# Patient Record
Sex: FEMALE | Race: WHITE | NOT HISPANIC OR LATINO | Employment: OTHER | URBAN - METROPOLITAN AREA
[De-identification: names, ages, dates, MRNs, and addresses within clinical notes are randomized per-mention and may not be internally consistent; named-entity substitution may affect disease eponyms.]

---

## 2019-04-04 ENCOUNTER — OFFICE VISIT (OUTPATIENT)
Dept: SURGERY | Facility: CLINIC | Age: 69
End: 2019-04-04
Payer: MEDICARE

## 2019-04-04 ENCOUNTER — HOSPITAL ENCOUNTER (OUTPATIENT)
Dept: PULMONOLOGY | Facility: HOSPITAL | Age: 69
Discharge: HOME | End: 2019-04-04
Attending: COLON & RECTAL SURGERY
Payer: MEDICARE

## 2019-04-04 VITALS
SYSTOLIC BLOOD PRESSURE: 126 MMHG | HEIGHT: 68 IN | RESPIRATION RATE: 16 BRPM | HEART RATE: 64 BPM | WEIGHT: 202.81 LBS | BODY MASS INDEX: 30.74 KG/M2 | DIASTOLIC BLOOD PRESSURE: 68 MMHG

## 2019-04-04 DIAGNOSIS — K64.4 EXTERNAL HEMORRHOIDS: ICD-10-CM

## 2019-04-04 DIAGNOSIS — K64.4 EXTERNAL HEMORRHOIDS: Primary | ICD-10-CM

## 2019-04-04 PROCEDURE — 46600 PR DIAG2STIC A2SCOPY: ICD-10-PCS | Mod: S$GLB,,, | Performed by: COLON & RECTAL SURGERY

## 2019-04-04 PROCEDURE — 46600 DIAGNOSTIC ANOSCOPY SPX: CPT | Mod: S$GLB,,, | Performed by: COLON & RECTAL SURGERY

## 2019-04-04 PROCEDURE — 99203 OFFICE O/P NEW LOW 30 MIN: CPT | Mod: 25,S$GLB,, | Performed by: COLON & RECTAL SURGERY

## 2019-04-04 PROCEDURE — 1101F PT FALLS ASSESS-DOCD LE1/YR: CPT | Mod: CPTII,S$GLB,, | Performed by: COLON & RECTAL SURGERY

## 2019-04-04 PROCEDURE — 99999 PR PBB SHADOW E&M-EST. PATIENT-LVL V: ICD-10-PCS | Mod: PBBFAC,,, | Performed by: COLON & RECTAL SURGERY

## 2019-04-04 PROCEDURE — 99203 PR OFFICE/OUTPT VISIT, NEW, LEVL III, 30-44 MIN: ICD-10-PCS | Mod: 25,S$GLB,, | Performed by: COLON & RECTAL SURGERY

## 2019-04-04 PROCEDURE — 93005 ELECTROCARDIOGRAM TRACING: CPT

## 2019-04-04 PROCEDURE — 1101F PR PT FALLS ASSESS DOC 0-1 FALLS W/OUT INJ PAST YR: ICD-10-PCS | Mod: CPTII,S$GLB,, | Performed by: COLON & RECTAL SURGERY

## 2019-04-04 PROCEDURE — 93010 EKG 12-LEAD: ICD-10-PCS | Mod: ,,, | Performed by: INTERNAL MEDICINE

## 2019-04-04 PROCEDURE — 93010 ELECTROCARDIOGRAM REPORT: CPT | Mod: ,,, | Performed by: INTERNAL MEDICINE

## 2019-04-04 PROCEDURE — 99999 PR PBB SHADOW E&M-EST. PATIENT-LVL V: CPT | Mod: PBBFAC,,, | Performed by: COLON & RECTAL SURGERY

## 2019-04-04 RX ORDER — ATORVASTATIN CALCIUM 20 MG/1
20 TABLET, FILM COATED ORAL DAILY
COMMUNITY
End: 2019-12-18 | Stop reason: SDUPTHER

## 2019-04-04 RX ORDER — ASPIRIN 81 MG/1
81 TABLET ORAL DAILY
COMMUNITY
End: 2020-08-14

## 2019-04-04 RX ORDER — TIZANIDINE 4 MG/1
4 TABLET ORAL NIGHTLY PRN
COMMUNITY
End: 2019-08-14

## 2019-04-04 RX ORDER — DEXLANSOPRAZOLE 30 MG/1
30 CAPSULE, DELAYED RELEASE ORAL DAILY
COMMUNITY
End: 2019-12-23 | Stop reason: SDUPTHER

## 2019-04-04 RX ORDER — CHLORTHALIDONE 25 MG/1
25 TABLET ORAL DAILY
COMMUNITY
End: 2019-12-18 | Stop reason: SDUPTHER

## 2019-04-04 RX ORDER — DULOXETIN HYDROCHLORIDE 60 MG/1
60 CAPSULE, DELAYED RELEASE ORAL DAILY
COMMUNITY
End: 2019-08-14 | Stop reason: ALTCHOICE

## 2019-04-04 NOTE — PROGRESS NOTES
Subjective:       Patient ID: Serina Dumas is a 68 y.o. female.    Chief Complaint: Hemorrhoids    HPI 67 yo F here with complaints of hemorrhoids.  She takes Linzess for chronic constipation, which worsened after back surgery in 2017..  She has a BM every other day but feels like she never completely evacuates.  She c/o pain and discomfort with BM's.  She also reports intermittent BRB on toilet paper when she wipes after BM's.    Last colonoscopy 2018 - polyp removed, internal hemorrhoids.    No family hx of CRC or IBD.  Sister  of breast cancer, father  of lung cancer.       Review of patient's allergies indicates:   Allergen Reactions    Adhesive Itching    Cleocin [clindamycin hcl]     Demerol [meperidine]     Keflex [cephalexin]        Past Medical History:   Diagnosis Date    Thyroid disease        Past Surgical History:   Procedure Laterality Date    BACK SURGERY      HYSTERECTOMY      TONSILLECTOMY      TYMPANOSTOMY TUBE PLACEMENT         Current Outpatient Medications   Medication Sig Dispense Refill    aspirin (ECOTRIN) 81 MG EC tablet Take 81 mg by mouth once daily.      atorvastatin (LIPITOR) 20 MG tablet Take 20 mg by mouth once daily.      chlorthalidone (HYGROTEN) 25 MG Tab Take 25 mg by mouth once daily.      dexlansoprazole (DEXILANT) 30 mg CpDM Take 30 mg by mouth once daily.      DULoxetine (CYMBALTA) 60 MG capsule Take 60 mg by mouth once daily.      levothyroxine (SYNTHROID) 137 MCG Tab tablet Take by mouth before breakfast.      linaclotide (LINZESS) 145 mcg Cap capsule Take 145 mcg by mouth once daily.      tiZANidine (ZANAFLEX) 4 MG tablet Take 4 mg by mouth nightly as needed.      vit A/vit C/vit E/zinc/copper (ICAPS AREDS ORAL) Take by mouth.      alprazolam (XANAX) 0.25 MG tablet Take 0.25 mg by mouth 3 (three) times daily.      apixaban 5 mg Tab Take by mouth 2 (two) times daily.      fluoxetine (PROZAC) 20 MG capsule Take 20 mg by mouth once daily.       hydrocodone-acetaminophen 5-325mg (NORCO) 5-325 mg per tablet Take 1 tablet by mouth every 6 (six) hours as needed for Pain. 30 tablet 0    ranitidine (ZANTAC) 150 MG tablet Take 150 mg by mouth 2 (two) times daily.      tramadol (ULTRAM) 50 mg tablet Take 50 mg by mouth every 6 (six) hours as needed for Pain.       No current facility-administered medications for this visit.        Family History   Problem Relation Age of Onset    Diabetes Mother     Cancer Father        Social History     Socioeconomic History    Marital status: Single     Spouse name: Not on file    Number of children: Not on file    Years of education: Not on file    Highest education level: Not on file   Occupational History    Not on file   Social Needs    Financial resource strain: Not on file    Food insecurity:     Worry: Not on file     Inability: Not on file    Transportation needs:     Medical: Not on file     Non-medical: Not on file   Tobacco Use    Smoking status: Light Tobacco Smoker   Substance and Sexual Activity    Alcohol use: No    Drug use: No    Sexual activity: Not on file   Lifestyle    Physical activity:     Days per week: Not on file     Minutes per session: Not on file    Stress: Not on file   Relationships    Social connections:     Talks on phone: Not on file     Gets together: Not on file     Attends Jain service: Not on file     Active member of club or organization: Not on file     Attends meetings of clubs or organizations: Not on file     Relationship status: Not on file   Other Topics Concern    Not on file   Social History Narrative    Not on file       Review of Systems   Constitutional: Negative for chills and fever.   HENT: Negative for congestion and sore throat.    Eyes: Negative for visual disturbance.   Respiratory: Negative for cough and shortness of breath.    Cardiovascular: Negative for chest pain and palpitations.   Gastrointestinal: Positive for anal bleeding,  constipation and rectal pain. Negative for abdominal distention, abdominal pain, blood in stool, diarrhea, nausea and vomiting.   Endocrine: Negative for cold intolerance and heat intolerance.   Genitourinary: Negative for dysuria and frequency.   Musculoskeletal: Positive for back pain and neck pain. Negative for arthralgias.   Skin: Negative for rash.   Allergic/Immunologic: Negative for immunocompromised state.   Neurological: Negative for dizziness, light-headedness and headaches.   Hematological: Does not bruise/bleed easily.   Psychiatric/Behavioral: Negative for confusion. The patient is not nervous/anxious.        Objective:      Physical Exam   Constitutional: She is oriented to person, place, and time. She appears well-developed and well-nourished.   HENT:   Head: Normocephalic.   Pulmonary/Chest: Effort normal. No respiratory distress.   Abdominal: Soft. Bowel sounds are normal. She exhibits no distension and no mass. There is no tenderness. There is no rebound and no guarding.   Genitourinary:   Genitourinary Comments: Perineum - normal perianal skin, no mass, no fissure, large inflamed anterior midline external hemorrhoid  DOMINGO - good tone, no mass  Anoscopy - Grade 1 internal hemorrhoids without significant inflammation     Musculoskeletal: Normal range of motion.   Neurological: She is alert and oriented to person, place, and time.   Skin: Skin is warm and dry.   Psychiatric: She has a normal mood and affect.           Assessment:       1. External hemorrhoids        Plan:   We discussed aggressive conservative measures versus excisional hemorrhoidectomy, and she would like to proceed with surgery. We discussed the expected degree and duration of postoperative discomfort, and she voiced her understanding of this.  We also discussed the importance of avoiding constipation in the immediate postoperative period as much as possible.    She is scheduled for excisional [facility]    I have discussed the procedure at length with Serina Dumas.  We discussed the rationale, risks, benefits, and alternatives in depth.  We discussed the expected outcomes and potential complications including but not limited to Delayed/prolonged wound healing, bleeding, infection, recurrence, prolonged pain, need for further procedures and altered continence.  She verbalized her understanding of the procedure and wishes to proceed.  Written consent was obtained.      Nicho Meza MD, FACS, FASCRS  Senior Staff Surgeon  Department of Colon & Rectal Surgery

## 2019-04-04 NOTE — H&P (VIEW-ONLY)
Subjective:       Patient ID: Serina Dumas is a 68 y.o. female.    Chief Complaint: Hemorrhoids    HPI 69 yo F here with complaints of hemorrhoids.  She takes Linzess for chronic constipation, which worsened after back surgery in 2017..  She has a BM every other day but feels like she never completely evacuates.  She c/o pain and discomfort with BM's.  She also reports intermittent BRB on toilet paper when she wipes after BM's.    Last colonoscopy 2018 - polyp removed, internal hemorrhoids.    No family hx of CRC or IBD.  Sister  of breast cancer, father  of lung cancer.       Review of patient's allergies indicates:   Allergen Reactions    Adhesive Itching    Cleocin [clindamycin hcl]     Demerol [meperidine]     Keflex [cephalexin]        Past Medical History:   Diagnosis Date    Thyroid disease        Past Surgical History:   Procedure Laterality Date    BACK SURGERY      HYSTERECTOMY      TONSILLECTOMY      TYMPANOSTOMY TUBE PLACEMENT         Current Outpatient Medications   Medication Sig Dispense Refill    aspirin (ECOTRIN) 81 MG EC tablet Take 81 mg by mouth once daily.      atorvastatin (LIPITOR) 20 MG tablet Take 20 mg by mouth once daily.      chlorthalidone (HYGROTEN) 25 MG Tab Take 25 mg by mouth once daily.      dexlansoprazole (DEXILANT) 30 mg CpDM Take 30 mg by mouth once daily.      DULoxetine (CYMBALTA) 60 MG capsule Take 60 mg by mouth once daily.      levothyroxine (SYNTHROID) 137 MCG Tab tablet Take by mouth before breakfast.      linaclotide (LINZESS) 145 mcg Cap capsule Take 145 mcg by mouth once daily.      tiZANidine (ZANAFLEX) 4 MG tablet Take 4 mg by mouth nightly as needed.      vit A/vit C/vit E/zinc/copper (ICAPS AREDS ORAL) Take by mouth.      alprazolam (XANAX) 0.25 MG tablet Take 0.25 mg by mouth 3 (three) times daily.      apixaban 5 mg Tab Take by mouth 2 (two) times daily.      fluoxetine (PROZAC) 20 MG capsule Take 20 mg by mouth once daily.       hydrocodone-acetaminophen 5-325mg (NORCO) 5-325 mg per tablet Take 1 tablet by mouth every 6 (six) hours as needed for Pain. 30 tablet 0    ranitidine (ZANTAC) 150 MG tablet Take 150 mg by mouth 2 (two) times daily.      tramadol (ULTRAM) 50 mg tablet Take 50 mg by mouth every 6 (six) hours as needed for Pain.       No current facility-administered medications for this visit.        Family History   Problem Relation Age of Onset    Diabetes Mother     Cancer Father        Social History     Socioeconomic History    Marital status: Single     Spouse name: Not on file    Number of children: Not on file    Years of education: Not on file    Highest education level: Not on file   Occupational History    Not on file   Social Needs    Financial resource strain: Not on file    Food insecurity:     Worry: Not on file     Inability: Not on file    Transportation needs:     Medical: Not on file     Non-medical: Not on file   Tobacco Use    Smoking status: Light Tobacco Smoker   Substance and Sexual Activity    Alcohol use: No    Drug use: No    Sexual activity: Not on file   Lifestyle    Physical activity:     Days per week: Not on file     Minutes per session: Not on file    Stress: Not on file   Relationships    Social connections:     Talks on phone: Not on file     Gets together: Not on file     Attends [employer] service: Not on file     Active member of club or organization: Not on file     Attends meetings of clubs or organizations: Not on file     Relationship status: Not on file   Other Topics Concern    Not on file   Social History Narrative    Not on file       Review of Systems   Constitutional: Negative for chills and fever.   HENT: Negative for congestion and sore throat.    Eyes: Negative for visual disturbance.   Respiratory: Negative for cough and shortness of breath.    Cardiovascular: Negative for chest pain and palpitations.   Gastrointestinal: Positive for anal bleeding,  constipation and rectal pain. Negative for abdominal distention, abdominal pain, blood in stool, diarrhea, nausea and vomiting.   Endocrine: Negative for cold intolerance and heat intolerance.   Genitourinary: Negative for dysuria and frequency.   Musculoskeletal: Positive for back pain and neck pain. Negative for arthralgias.   Skin: Negative for rash.   Allergic/Immunologic: Negative for immunocompromised state.   Neurological: Negative for dizziness, light-headedness and headaches.   Hematological: Does not bruise/bleed easily.   Psychiatric/Behavioral: Negative for confusion. The patient is not nervous/anxious.        Objective:      Physical Exam   Constitutional: She is oriented to person, place, and time. She appears well-developed and well-nourished.   HENT:   Head: Normocephalic.   Pulmonary/Chest: Effort normal. No respiratory distress.   Abdominal: Soft. Bowel sounds are normal. She exhibits no distension and no mass. There is no tenderness. There is no rebound and no guarding.   Genitourinary:   Genitourinary Comments: Perineum - normal perianal skin, no mass, no fissure, large inflamed anterior midline external hemorrhoid  DOMINGO - good tone, no mass  Anoscopy - Grade 1 internal hemorrhoids without significant inflammation     Musculoskeletal: Normal range of motion.   Neurological: She is alert and oriented to person, place, and time.   Skin: Skin is warm and dry.   Psychiatric: She has a normal mood and affect.           Assessment:       1. External hemorrhoids        Plan:   We discussed aggressive conservative measures versus excisional hemorrhoidectomy, and she would like to proceed with surgery. We discussed the expected degree and duration of postoperative discomfort, and she voiced her understanding of this.  We also discussed the importance of avoiding constipation in the immediate postoperative period as much as possible.    She is scheduled for excisional [facility]    I have discussed the procedure at length with Serina Dumas.  We discussed the rationale, risks, benefits, and alternatives in depth.  We discussed the expected outcomes and potential complications including but not limited to Delayed/prolonged wound healing, bleeding, infection, recurrence, prolonged pain, need for further procedures and altered continence.  She verbalized her understanding of the procedure and wishes to proceed.  Written consent was obtained.      Nicho Meza MD, FACS, FASCRS  Senior Staff Surgeon  Department of Colon & Rectal Surgery

## 2019-04-04 NOTE — LETTER
April 4, 2019      Nitin Ray MD  [address]  [address]  [facility] [address]           [facility]  [address] [place] [id number]  Phone: [phone]  Fax: [phone]          Patient: Serina Dumas   MR Number: [id number]   YOB: 1950   Date of Visit: 4/4/2019       Dear Dr. Nitin Ray:    Thank you for referring Serina Dumas to me for evaluation. Attached you will find relevant portions of my assessment and plan of care.    If you have questions, please do not hesitate to call me. I look forward to following Serina Dumas along with you.    Sincerely,    Nicho Meza MD    Enclosure  CC:  Alyssa Turner MD    If you would like to receive this communication electronically, please contact [email or web address] or [phone] to request more information on [employer] Link access.    For providers and/or their staff who would like to refer a patient to [facility], please contact us through our one-stop-shop provider referral line, [facility], at [phone].    If you feel you have received this communication in error or would no longer like to receive these types of communications, please e-mail [email or web address]

## 2019-04-25 ENCOUNTER — HOSPITAL ENCOUNTER (OUTPATIENT)
Facility: HOSPITAL | Age: 69
Discharge: HOME | End: 2019-04-25
Attending: COLON & RECTAL SURGERY | Admitting: COLON & RECTAL SURGERY
Payer: MEDICARE

## 2019-04-25 ENCOUNTER — ANESTHESIA (OUTPATIENT)
Dept: SURGERY | Facility: HOSPITAL | Age: 69
End: 2019-04-25
Payer: MEDICARE

## 2019-04-25 ENCOUNTER — ANESTHESIA EVENT (OUTPATIENT)
Dept: SURGERY | Facility: HOSPITAL | Age: 69
End: 2019-04-25
Payer: MEDICARE

## 2019-04-25 ENCOUNTER — TELEPHONE (OUTPATIENT)
Dept: SURGERY | Facility: CLINIC | Age: 69
End: 2019-04-25

## 2019-04-25 VITALS
TEMPERATURE: 97 F | DIASTOLIC BLOOD PRESSURE: 69 MMHG | RESPIRATION RATE: 18 BRPM | SYSTOLIC BLOOD PRESSURE: 149 MMHG | HEART RATE: 72 BPM | OXYGEN SATURATION: 100 %

## 2019-04-25 DIAGNOSIS — K64.9 HEMORRHOIDS, UNSPECIFIED HEMORRHOID TYPE: Primary | ICD-10-CM

## 2019-04-25 DIAGNOSIS — K64.9 HEMORRHOIDS: ICD-10-CM

## 2019-04-25 PROCEDURE — 00902 ANES ANORECTAL PX: CPT | Performed by: COLON & RECTAL SURGERY

## 2019-04-25 PROCEDURE — 46255 PR HEMORRHOIDECTOMY,INT/EXT,1 COLUMN/GROUP: ICD-10-PCS | Mod: 52,,, | Performed by: COLON & RECTAL SURGERY

## 2019-04-25 PROCEDURE — 37000008 HC ANESTHESIA 1ST 15 MINUTES: Performed by: COLON & RECTAL SURGERY

## 2019-04-25 PROCEDURE — 63600175 PHARM REV CODE 636 W HCPCS: Performed by: NURSE ANESTHETIST, CERTIFIED REGISTERED

## 2019-04-25 PROCEDURE — 37000009 HC ANESTHESIA EA ADD 15 MINS: Performed by: COLON & RECTAL SURGERY

## 2019-04-25 PROCEDURE — 88304 TISSUE EXAM BY PATHOLOGIST: CPT | Mod: 26,,, | Performed by: PATHOLOGY

## 2019-04-25 PROCEDURE — 88304 TISSUE SPECIMEN TO PATHOLOGY - SURGERY: ICD-10-PCS | Mod: 26,,, | Performed by: PATHOLOGY

## 2019-04-25 PROCEDURE — 71000033 HC RECOVERY, INTIAL HOUR: Performed by: COLON & RECTAL SURGERY

## 2019-04-25 PROCEDURE — 25000003 PHARM REV CODE 250: Performed by: NURSE ANESTHETIST, CERTIFIED REGISTERED

## 2019-04-25 PROCEDURE — S0020 INJECTION, BUPIVICAINE HYDRO: HCPCS | Performed by: COLON & RECTAL SURGERY

## 2019-04-25 PROCEDURE — 36000706: Performed by: COLON & RECTAL SURGERY

## 2019-04-25 PROCEDURE — 00902 ANES ANORECTAL PX: CPT | Mod: QZ,P2 | Performed by: NURSE ANESTHETIST, CERTIFIED REGISTERED

## 2019-04-25 PROCEDURE — 88304 TISSUE EXAM BY PATHOLOGIST: CPT | Performed by: PATHOLOGY

## 2019-04-25 PROCEDURE — 46255 REMOVE INT/EXT HEM 1 GROUP: CPT | Mod: 52,,, | Performed by: COLON & RECTAL SURGERY

## 2019-04-25 PROCEDURE — 25000003 PHARM REV CODE 250: Performed by: COLON & RECTAL SURGERY

## 2019-04-25 PROCEDURE — 36000707: Performed by: COLON & RECTAL SURGERY

## 2019-04-25 RX ORDER — FENTANYL CITRATE 50 UG/ML
INJECTION, SOLUTION INTRAMUSCULAR; INTRAVENOUS
Status: DISCONTINUED | OUTPATIENT
Start: 2019-04-25 | End: 2019-04-25

## 2019-04-25 RX ORDER — MIDAZOLAM HYDROCHLORIDE 1 MG/ML
INJECTION, SOLUTION INTRAMUSCULAR; INTRAVENOUS
Status: DISCONTINUED | OUTPATIENT
Start: 2019-04-25 | End: 2019-04-25

## 2019-04-25 RX ORDER — PROPOFOL 10 MG/ML
INJECTION, EMULSION INTRAVENOUS
Status: DISCONTINUED | OUTPATIENT
Start: 2019-04-25 | End: 2019-04-25

## 2019-04-25 RX ORDER — LIDOCAINE HYDROCHLORIDE 10 MG/ML
INJECTION, SOLUTION EPIDURAL; INFILTRATION; INTRACAUDAL; PERINEURAL
Status: DISCONTINUED | OUTPATIENT
Start: 2019-04-25 | End: 2019-04-25 | Stop reason: HOSPADM

## 2019-04-25 RX ORDER — OXYCODONE AND ACETAMINOPHEN 10; 325 MG/1; MG/1
1 TABLET ORAL EVERY 4 HOURS PRN
Status: DISCONTINUED | OUTPATIENT
Start: 2019-04-25 | End: 2019-04-25 | Stop reason: HOSPADM

## 2019-04-25 RX ORDER — SODIUM CHLORIDE 9 MG/ML
INJECTION, SOLUTION INTRAVENOUS CONTINUOUS
Status: DISCONTINUED | OUTPATIENT
Start: 2019-04-25 | End: 2020-05-28

## 2019-04-25 RX ORDER — OXYCODONE AND ACETAMINOPHEN 5; 325 MG/1; MG/1
1 TABLET ORAL EVERY 6 HOURS PRN
Qty: 40 TABLET | Refills: 0 | Status: SHIPPED | OUTPATIENT
Start: 2019-04-25 | End: 2019-05-23

## 2019-04-25 RX ORDER — HYDROMORPHONE HYDROCHLORIDE 2 MG/ML
0.5 INJECTION, SOLUTION INTRAMUSCULAR; INTRAVENOUS; SUBCUTANEOUS
Status: DISCONTINUED | OUTPATIENT
Start: 2019-04-25 | End: 2019-04-25 | Stop reason: HOSPADM

## 2019-04-25 RX ORDER — MUPIROCIN 20 MG/G
OINTMENT TOPICAL
Status: ACTIVE | OUTPATIENT
Start: 2019-04-25

## 2019-04-25 RX ORDER — SODIUM CHLORIDE, SODIUM LACTATE, POTASSIUM CHLORIDE, CALCIUM CHLORIDE 600; 310; 30; 20 MG/100ML; MG/100ML; MG/100ML; MG/100ML
INJECTION, SOLUTION INTRAVENOUS CONTINUOUS PRN
Status: DISCONTINUED | OUTPATIENT
Start: 2019-04-25 | End: 2019-04-25

## 2019-04-25 RX ORDER — LIDOCAINE HCL/PF 100 MG/5ML
SYRINGE (ML) INTRAVENOUS
Status: DISCONTINUED | OUTPATIENT
Start: 2019-04-25 | End: 2019-04-25

## 2019-04-25 RX ORDER — BUPIVACAINE HYDROCHLORIDE 5 MG/ML
INJECTION, SOLUTION EPIDURAL; INTRACAUDAL
Status: DISCONTINUED | OUTPATIENT
Start: 2019-04-25 | End: 2019-04-25 | Stop reason: HOSPADM

## 2019-04-25 RX ADMIN — MIDAZOLAM 2 MG: 1 INJECTION INTRAMUSCULAR; INTRAVENOUS at 09:04

## 2019-04-25 RX ADMIN — PROPOFOL 100 MG: 10 INJECTION, EMULSION INTRAVENOUS at 09:04

## 2019-04-25 RX ADMIN — LIDOCAINE HYDROCHLORIDE 60 MG: 20 INJECTION, SOLUTION INTRAVENOUS at 09:04

## 2019-04-25 RX ADMIN — FENTANYL CITRATE 50 MCG: 50 INJECTION, SOLUTION INTRAMUSCULAR; INTRAVENOUS at 09:04

## 2019-04-25 RX ADMIN — SODIUM CHLORIDE, SODIUM LACTATE, POTASSIUM CHLORIDE, AND CALCIUM CHLORIDE: .6; .31; .03; .02 INJECTION, SOLUTION INTRAVENOUS at 09:04

## 2019-04-25 NOTE — TELEPHONE ENCOUNTER
----- Message from Desire Fontanez sent at 4/25/2019 11:31 AM CDT -----  Contact: pt#[phone]  Needs Advice    Reason for call:pt needs a two week post op in [facility]         Communication Preference:call    Additional Information:

## 2019-04-25 NOTE — BRIEF OP NOTE
[facility]  Brief Operative Note    SUMMARY     Surgery Date: 4/25/2019     Surgeon(s) and Role:     * Nicho Meza MD - Primary    Assisting Surgeon: None      Pre-op Diagnosis:  External hemorrhoids [K64.4]    Operative Care:  Post-op Diagnosis: Post-Op Diagnosis Codes:     * External hemorrhoids [K64.4]    Procedure(s) (LRB):  HEMORRHOIDECTOMY (N/A)    Anesthesia: Local/MAC  Total volume administered 300 cc     Technical Procedures Used: Excision large anterior midline external hemorrhod    Description of the findings of the procedure: Large anterior midline external hemorrhoid     Wound Class (Contaminated)    Complications: No     Estimated Blood Loss: * No values recorded between 4/25/2019  9:36 AM and 4/25/2019  9:49 AM * <5 cc          Specimens: Anterior midline external hemorrhoid    Implants: * No implants in log *    Post-Operative Care:         Disposition: PACU - hemodynamically stable.           Condition: Good  PT Temp >36 upon leaving the OR? Yes

## 2019-04-25 NOTE — ANESTHESIA PREPROCEDURE EVALUATION
04/25/2019  Serina Dumas is a 68 y.o., female.    Anesthesia Evaluation    I have reviewed the Patient Summary Reports.    I have reviewed the Nursing Notes.   I have reviewed the Medications.     Review of Systems  Anesthesia Hx:  No problems with previous Anesthesia    Social:  Non-Smoker, No Alcohol Use    Hematology/Oncology:  Hematology Normal   Oncology Normal     EENT/Dental:EENT/Dental Normal   Cardiovascular:  Cardiovascular Normal Exercise tolerance: good     Pulmonary:  Pulmonary Normal    Renal/:  Renal/ Normal     Hepatic/GI:  Hepatic/GI Normal    Musculoskeletal:  Musculoskeletal Normal    Neurological:  Neurology Normal    Endocrine:  Endocrine Normal    Dermatological:  Skin Normal    Psych:  Psychiatric Normal           Physical Exam  General:  Obesity    Airway/Jaw/Neck:  Airway Findings: Mouth Opening: Normal Tongue: Normal  General Airway Assessment: Adult  Mallampati: II  TM Distance: Normal, at least 6 cm  Jaw/Neck Findings:  Neck ROM: Normal ROM      Dental:  Dental Findings: In tact        Mental Status:  Mental Status Findings:  Cooperative         Anesthesia Plan  Type of Anesthesia, risks & benefits discussed:  Anesthesia Type:  general, MAC  Patient's Preference:   Intra-op Monitoring Plan: standard ASA monitors  Intra-op Monitoring Plan Comments:   Post Op Pain Control Plan: multimodal analgesia  Post Op Pain Control Plan Comments:   Induction:   IV  Beta Blocker:  Patient is not currently on a Beta-Blocker (No further documentation required).       Informed Consent: Patient understands risks and agrees with Anesthesia plan.  Questions answered. Anesthesia consent signed with patient.  ASA Score: 2     Day of Surgery Review of History & Physical: I have interviewed and examined the patient. I have reviewed the patient's H&P dated: 4/25/19. There are no significant changes.   H&P update referred to the surgeon.         Ready For Surgery From Anesthesia Perspective.

## 2019-04-25 NOTE — OP NOTE
DATE OF PROCEDURE:  04/25/2019    PREOPERATIVE DIAGNOSIS:  Symptomatic external hemorrhoids.    POSTOPERATIVE DIAGNOSIS:  Symptomatic external hemorrhoids.    PROCEDURE:  Excisional hemorrhoidectomy (single column, external only).    SURGEON:  Nicho Meza M.D.    ASSISTANT:  None.    ANESTHESIA:  Local MAC.    IV FLUIDS:  300 mL crystalloid.    BLOOD LOSS:  Less than 5 mL.    DRAINS:  None.    SPECIMENS:  Anterior midline external hemorrhoid to Pathology.    COMPLICATIONS:  None.    OPERATIVE FINDINGS:  Large inflamed anterior midline external hemorrhoid, no   significant internal hemorrhoidal disease.    INDICATIONS:  The patient is a 68-year-old female who presented to the office   with complaints of perianal pain, irritation and bleeding.  On examination, she   was found to have a large anterior midline external hemorrhoid as well as some   smaller internal hemorrhoidal disease.  We discussed conservative options in   terms of local symptomatic relief versus surgical excision of the external   hemorrhoid and she elected for the latter.  She presents today for an elective   hemorrhoidectomy.    DESCRIPTION OF PROCEDURE:  The patient was identified, brought to the Operating   Room and placed on the table in a prone position after obtaining informed   consent and after ensuring adequate padding of all pressure points.  After   initiation of monitored anesthesia care, the table was jackknifed and the   buttocks were taped apart to efface the anal verge.  The perianal region was   prepped and draped in usual sterile fashion.  An anal block was performed using   a combination of 1% lidocaine and 0.25% Marcaine.  On visual inspection of the   anal verge and anal margin, there was a large anterior midline external   hemorrhoid present.  Evaluation of the anal canal with a lighted Su   retractor revealed minimal internal hemorrhoidal disease, which did not require   surgical intervention.  I then turned my attention  towards exercising the   anterior midline external hemorrhoid.  An elliptical incision was made around   the hemorrhoidal tissue, was dissected off of the underlying sphincter   musculature making sure to ensure that there was no inadvertent injury to the   sphincter muscle.  Once the hemorrhoidal tissue was completely excised, this was   passed from the field and sent to Pathology.  Hemostasis was achieved with   electrocautery and the skin incision was closed with interrupted 3-0 chromic   sutures.  The anal canal was irrigated and noted to be hemostatic.  Gelfoam   gauze was placed within the anal canal and sterile dressings were applied.    The patient tolerated the procedure well with no complications.  She was   awakened from sedation in the Operating Room and taken to Recovery in   satisfactory condition.  All needle, instrument and sponge counts were correct   at the end of the case.  I was present throughout the entire procedure.      ABBY/IN  dd: 04/25/2019 11:03:39 (CDT)  td: 04/25/2019 11:17:21 (CDT)  Doc ID   #[id number]  Job ID #[id number]    CC:

## 2019-04-25 NOTE — DISCHARGE SUMMARY
Discharge Note      SUMMARY     Admit Date: 4/25/2019    Attending Physician: Nicho Meza MD     Discharge Physician: Nicho Meza MD    Discharge Date: 4/25/2019     Final Diagnosis: Post-Op Diagnosis Codes:     * External hemorrhoids [K64.4]    Hospital Course: Patient was admitted for an outpatient procedure and tolerated the procedure well with no complications.    Disposition: Home or Self Care    Follow Up/Patient Instructions:     High fiber diet/daily fiber supplement  8-10 glasses of water/day  Colace 100 mg 2x/day  Miralax 1 capful in glass of water 2x/day  Mineral oil 1 tbsp 2x/day for 1 week  Avoid straining/constipation  Gauze packing will pass with 1st BM  Dry dressing as needed  Some bleeding is expected - call for excessive bleeding  Motrin/Advil for milder pain  Percocet for more severe pain  Call for severe pain, fever >101, difficulty urinating, constipation  Follow-up with Dr. Meza in 3 weeks      Current Discharge Medication List      START taking these medications    Details   oxyCODONE-acetaminophen (PERCOCET) 5-325 mg per tablet Take 1 tablet by mouth every 6 (six) hours as needed for Pain.  Qty: 40 tablet, Refills: 0         CONTINUE these medications which have NOT CHANGED    Details   alprazolam (XANAX) 0.25 MG tablet Take 0.25 mg by mouth 3 (three) times daily.      aspirin (ECOTRIN) 81 MG EC tablet Take 81 mg by mouth once daily.      atorvastatin (LIPITOR) 20 MG tablet Take 20 mg by mouth once daily.      chlorthalidone (HYGROTEN) 25 MG Tab Take 25 mg by mouth once daily.      dexlansoprazole (DEXILANT) 30 mg CpDM Take 30 mg by mouth once daily.      DULoxetine (CYMBALTA) 60 MG capsule Take 60 mg by mouth once daily.      levothyroxine (SYNTHROID) 137 MCG Tab tablet Take by mouth before breakfast.      linaclotide (LINZESS) 145 mcg Cap capsule Take 145 mcg by mouth once daily.      tiZANidine (ZANAFLEX) 4 MG tablet Take 4 mg by mouth nightly as needed.             Discharge  Procedure Orders (must include Diet, Follow-up, Activity)   Discharge Procedure Orders (must include Diet, Follow-up, Activity)   Activity as tolerated

## 2019-04-25 NOTE — ANESTHESIA POSTPROCEDURE EVALUATION
Anesthesia Post Evaluation    Patient: Serina Dumas    Procedure(s) Performed: Procedure(s) (LRB):  HEMORRHOIDECTOMY (N/A)    Final Anesthesia Type: general  Patient location during evaluation: PACU  Patient participation: Yes- Able to Participate  Level of consciousness: awake and alert, oriented and awake  Post-procedure vital signs: reviewed and stable  Pain management: adequate  Airway patency: patent  PONV status at discharge: No PONV  Anesthetic complications: no      Cardiovascular status: blood pressure returned to baseline  Respiratory status: unassisted, spontaneous ventilation and room air  Hydration status: euvolemic  Follow-up not needed.  Comments: [facility]          Vitals Value Taken Time   /72 4/25/2019 10:10 AM   Temp 35.9 °C (96.6 °F) 4/25/2019 10:00 AM   Pulse 75 4/25/2019 10:10 AM   Resp 18 4/25/2019 10:10 AM   SpO2 95 % 4/25/2019 10:10 AM         No case tracking events are documented in the log.      Pain/Kathleen Score: Kathleen Score: 10 (4/25/2019 10:10 AM)

## 2019-04-25 NOTE — NURSING
Patient in stable condition. Ambulating, voiding and tolerating PO well. No complaints of rectal pain.  Denies nausea, vomiting. Gauze to anal area dry and intact with no drainage noted.  Reviewed discharge instructions, medications, follow up appointment and reportable signs and symptoms with patient and significant other.  Prescription given.  Discharged home via wheelchair accompanied to lobby with transport staff.

## 2019-05-23 ENCOUNTER — OFFICE VISIT (OUTPATIENT)
Dept: SURGERY | Facility: CLINIC | Age: 69
End: 2019-05-23
Payer: MEDICARE

## 2019-05-23 VITALS
RESPIRATION RATE: 14 BRPM | BODY MASS INDEX: 31 KG/M2 | HEIGHT: 68 IN | HEART RATE: 71 BPM | SYSTOLIC BLOOD PRESSURE: 116 MMHG | DIASTOLIC BLOOD PRESSURE: 60 MMHG | WEIGHT: 204.56 LBS

## 2019-05-23 DIAGNOSIS — K64.4 EXTERNAL HEMORRHOIDS: Primary | ICD-10-CM

## 2019-05-23 PROCEDURE — 99024 POSTOP FOLLOW-UP VISIT: CPT | Mod: S$GLB,,, | Performed by: COLON & RECTAL SURGERY

## 2019-05-23 PROCEDURE — 99999 PR PBB SHADOW E&M-EST. PATIENT-LVL III: ICD-10-PCS | Mod: PBBFAC,,, | Performed by: COLON & RECTAL SURGERY

## 2019-05-23 PROCEDURE — 99024 PR POST-OP FOLLOW-UP VISIT: ICD-10-PCS | Mod: S$GLB,,, | Performed by: COLON & RECTAL SURGERY

## 2019-05-23 PROCEDURE — 99999 PR PBB SHADOW E&M-EST. PATIENT-LVL III: CPT | Mod: PBBFAC,,, | Performed by: COLON & RECTAL SURGERY

## 2019-05-23 RX ORDER — TEMAZEPAM 30 MG/1
30 CAPSULE ORAL NIGHTLY PRN
COMMUNITY
End: 2020-01-30 | Stop reason: SDUPTHER

## 2019-05-23 RX ORDER — DIFLUPREDNATE OPHTHALMIC 0.5 MG/ML
1 EMULSION OPHTHALMIC DAILY
COMMUNITY
End: 2019-10-04

## 2019-05-23 NOTE — LETTER
May 23, 2019      Nitin Ray MD  [address]  [address]  [facility] [address]           [place] - Colon/Rectal Surg  [address] [facility] [address]  Phone: [phone]          Patient: Serina Dumas   MR Number: [id number]   YOB: 1950   Date of Visit: 5/23/2019       Dear Dr. Ray:    Thank you for referring Serina Dumas to me for evaluation. Attached you will find relevant portions of my assessment and plan of care.    If you have questions, please do not hesitate to call me. I look forward to following Serina Dumas along with you.    Sincerely,    Nicho Meza MD    Enclosure  CC:  Alyssa Turner MD    If you would like to receive this communication electronically, please contact [email or web address] or [phone] to request more information on [employer] Link access.    For providers and/or their staff who would like to refer a patient to [facility], please contact us through our one-stop-shop provider referral line, [facility], at [phone].    If you feel you have received this communication in error or would no longer like to receive these types of communications, please e-mail [email or web address]

## 2019-05-23 NOTE — PROGRESS NOTES
CRS Post-operative visit    Visit Info:     Procedure: Excisional hemorrhoidectomy (single column, anterior midline, external only)    Date of Procedure: April 25, 2019    Indication: 69 yo F with a large, inflamed anterior midline external hemorrhoid that was progressively symptomatic.  She wished to undergo surgical excision.    Current Status:  Overall doing well postop.  She does complain of some mild discomfort at the surgical site but overall this has been improving.  No bleeding. No problems with continence.  Her bowel movements have tenderness to be on the softer side because she is taking Colace in addition to Linzess, which he takes chronically.    Pathology:   Specimen submitted as hemorrhoid:  -Squamous mucosa exhibiting subepithelial fibrosis, mild chronic inflammation, and slight edema  -No vascular channels are identified. Correlate clinically.    Physical Exam:  General: White female in NAD   Neuro: aaox4   Respiratory: resps even unlabored  Extremities: Warm dry and intact  Anorectal:  Anterior midline hemorrhoidectomy incision    Assessment:  Symptomatic external hemorrhoid, status post excisional hemorrhoidectomy    Plan:  Discontinue Colace, continue Linzess.  RTO p.r.n.    Nicho Meza MD, FACS, FASCRS  Senior Staff Surgeon  Department of Colon & Rectal Surgery

## 2019-08-01 PROBLEM — D64.9 ANEMIA OF UNKNOWN ETIOLOGY: Status: ACTIVE | Noted: 2019-08-01

## 2019-08-01 PROBLEM — I82.409 DVT (DEEP VENOUS THROMBOSIS): Status: ACTIVE | Noted: 2019-08-01

## 2019-08-01 PROBLEM — I87.009 POST-PHLEBITIC SYNDROME: Status: ACTIVE | Noted: 2019-08-01

## 2019-08-01 PROBLEM — D68.51 FACTOR 5 LEIDEN MUTATION, HETEROZYGOUS: Status: ACTIVE | Noted: 2019-08-01

## 2019-08-14 PROBLEM — G47.33 OBSTRUCTIVE SLEEP APNEA SYNDROME: Status: ACTIVE | Noted: 2019-08-14

## 2020-05-26 PROBLEM — K63.89 MASS OF COLON: Status: ACTIVE | Noted: 2020-05-26

## 2020-05-26 PROBLEM — R19.5 HEME POSITIVE STOOL: Status: ACTIVE | Noted: 2020-05-26

## 2020-05-29 PROBLEM — Z43.3 COLOSTOMY CARE: Status: ACTIVE | Noted: 2020-05-29

## 2020-06-23 ENCOUNTER — TELEPHONE (OUTPATIENT)
Dept: GYNECOLOGIC ONCOLOGY | Facility: CLINIC | Age: 70
End: 2020-06-23

## 2020-06-23 NOTE — TELEPHONE ENCOUNTER
Left message returning Abigail call. Informed her that Serina will needs to have her medical records sent to Dr Sierra office for review fax number provided [phone]. Office number left to call back if she has any further questions.   ----- Message from Ashley Escalante sent at 6/23/2020  8:54 AM CDT -----  Regarding: New patient appointment  Contact: Abigail (caretaker)  Type: Patient Call Back    Who called: Abigail (caretaker)    What is the request in detail: Abigail (caretaker) is requesting a call back. She states that she would like to schedule an new patient appointment. She was referred by her PCP. She states that she was diagnosed with ovarian cancer.   Please advise.    Can the clinic reply by [facility]? No    Best call back number: [phone]    Additional Information: N/A

## 2020-06-24 ENCOUNTER — TELEPHONE (OUTPATIENT)
Dept: GYNECOLOGIC ONCOLOGY | Facility: CLINIC | Age: 70
End: 2020-06-24

## 2020-06-24 NOTE — TELEPHONE ENCOUNTER
Spoke with pt care taker. Discussed that no records have been received. Dr Bhat is the referring provider.   Contacted Roseanna Bhat nurse and gave fax number denies any other needs.   ----- Message from Mirna Maddox RN sent at 6/23/2020  4:58 PM CDT -----    ----- Message -----  From: Mandi Padilla  Sent: 6/23/2020   4:54 PM CDT  To: Rigo Mendiola Staff        Name of Who is Calling: BING LUNA [[id number]]      What is the request in detail: Pt called to speak to someone in the office about medical records that was sent to the office.Please contact to further discuss and advise.        Can the clinic reply by [facility]: N      What Number to Call Back if not in [facility]: [phone]

## 2020-07-07 ENCOUNTER — DOCUMENTATION ONLY (OUTPATIENT)
Dept: GYNECOLOGIC ONCOLOGY | Facility: CLINIC | Age: 70
End: 2020-07-07

## 2020-07-07 ENCOUNTER — INITIAL CONSULT (OUTPATIENT)
Dept: GYNECOLOGIC ONCOLOGY | Facility: CLINIC | Age: 70
End: 2020-07-07
Payer: MEDICARE

## 2020-07-07 VITALS
BODY MASS INDEX: 25.92 KG/M2 | SYSTOLIC BLOOD PRESSURE: 132 MMHG | HEIGHT: 67 IN | WEIGHT: 165.13 LBS | HEART RATE: 84 BPM | DIASTOLIC BLOOD PRESSURE: 61 MMHG

## 2020-07-07 DIAGNOSIS — C48.2 PRIMARY PERITONEAL CARCINOMATOSIS: ICD-10-CM

## 2020-07-07 DIAGNOSIS — I82.5Z2 CHRONIC DEEP VEIN THROMBOSIS (DVT) OF DISTAL VEIN OF LEFT LOWER EXTREMITY: Primary | ICD-10-CM

## 2020-07-07 PROCEDURE — 3008F PR BODY MASS INDEX (BMI) DOCUMENTED: ICD-10-PCS | Mod: CPTII,S$GLB,, | Performed by: OBSTETRICS & GYNECOLOGY

## 2020-07-07 PROCEDURE — 99999 PR PBB SHADOW E&M-EST. PATIENT-LVL IV: ICD-10-PCS | Mod: PBBFAC,,, | Performed by: OBSTETRICS & GYNECOLOGY

## 2020-07-07 PROCEDURE — 1101F PT FALLS ASSESS-DOCD LE1/YR: CPT | Mod: CPTII,S$GLB,, | Performed by: OBSTETRICS & GYNECOLOGY

## 2020-07-07 PROCEDURE — 1159F PR MEDICATION LIST DOCUMENTED IN MEDICAL RECORD: ICD-10-PCS | Mod: S$GLB,,, | Performed by: OBSTETRICS & GYNECOLOGY

## 2020-07-07 PROCEDURE — 3008F BODY MASS INDEX DOCD: CPT | Mod: CPTII,S$GLB,, | Performed by: OBSTETRICS & GYNECOLOGY

## 2020-07-07 PROCEDURE — 99999 PR PBB SHADOW E&M-EST. PATIENT-LVL IV: CPT | Mod: PBBFAC,,, | Performed by: OBSTETRICS & GYNECOLOGY

## 2020-07-07 PROCEDURE — 1101F PR PT FALLS ASSESS DOC 0-1 FALLS W/OUT INJ PAST YR: ICD-10-PCS | Mod: CPTII,S$GLB,, | Performed by: OBSTETRICS & GYNECOLOGY

## 2020-07-07 PROCEDURE — 99205 PR OFFICE/OUTPT VISIT, NEW, LEVL V, 60-74 MIN: ICD-10-PCS | Mod: S$GLB,,, | Performed by: OBSTETRICS & GYNECOLOGY

## 2020-07-07 PROCEDURE — 1125F AMNT PAIN NOTED PAIN PRSNT: CPT | Mod: S$GLB,,, | Performed by: OBSTETRICS & GYNECOLOGY

## 2020-07-07 PROCEDURE — 99205 OFFICE O/P NEW HI 60 MIN: CPT | Mod: S$GLB,,, | Performed by: OBSTETRICS & GYNECOLOGY

## 2020-07-07 PROCEDURE — 1125F PR PAIN SEVERITY QUANTIFIED, PAIN PRESENT: ICD-10-PCS | Mod: S$GLB,,, | Performed by: OBSTETRICS & GYNECOLOGY

## 2020-07-07 PROCEDURE — 1159F MED LIST DOCD IN RCRD: CPT | Mod: S$GLB,,, | Performed by: OBSTETRICS & GYNECOLOGY

## 2020-07-07 RX ORDER — PROMETHAZINE HYDROCHLORIDE 25 MG/1
25 TABLET ORAL EVERY 6 HOURS PRN
Qty: 30 TABLET | Refills: 0 | Status: SHIPPED | OUTPATIENT
Start: 2020-07-07 | End: 2020-07-14 | Stop reason: CLARIF

## 2020-07-07 NOTE — PROGRESS NOTES
"  69 yr old referred from Dr. Bhat for recently diagnosed ovarian cancer. Reported history of constipation and diarrhea over the past 6-8 months with a change in stool and bowel frequency and consistency. Saw her PCP recently for LLQ pain. Colonoscopy for history of IBS and colon polyps noted a colonic mass obstructing the recto-sigmoid colon. Biopsies were taken that were benign/granulation tissue.    CT C/A/P 5/26/2020  Circumferential wall thickening of the mid to distal aspect of the sigmoid colon extending to the rectum with diffuse narrowing of the bowel caliber and apparent tethering of the bowel and mesentery. Findings may be related to longstanding inflammation and resulting adhesive disease and/or fistulous tract.  Underlying mass in the differential.  There is a resulting component of obstruction of the more proximal large bowel. Adjacent loculated fluid within the pelvis. Subcentimeter nodularity of the omentum within the anterior aspect of the left hemipelvis.  Metastatic serosal implants are in the differential.    Partial colectomy with colostomy was performed the next day. Per op note, "colon moved into the rectosigmoid junction which was folded multiple times on top of itself stuck to itself with which seemed to be cancer. The patient had multiple retroperitoneal implants and anterior peritoneal wall implants visible" and "patient's rectum was stacked on top of itself and multiple loops compressing a perforation from this cancer an abscess cavity". Liver disease noted as well.    Final pathology   A) peritoneal mucinous material positive for small clusters of malignant cells  B) Left colon with attached rectum - carcinoma involving distal colon at site of dense fibrous adhesions, site of fistula and adjacent abscess   - multiple foci of carcinoma involving omentum attached to proximal half of specimen   - diverticular disease   - four mesenteric lymph nodes, negative for neoplasm  C) peritoneum " biopsy positive for microscopic focus of carcinoma  Comment: tumor morphology and IHC staining patter support thhe exclusion of a large intestine adenocarcinoma primary. After further review, confirmation of a Mullerian origin is supported by the positive ER and PAX-8 stains. Serous carcinomas typically arise from the uterus, ovary or fallopian tube but can also arise as a primary peritoneal serous carcinoma.    Discharged home POD #5 and presented to [facility] ED last night (7/6/2020) with RLE swelling and pain. LE doppler resulted positive for nonocclusive DVT in the right distal superficial femoral vein with occlusive thrombus extending throughout the popliteal vein extending in the calf into the proximal posterior tibial vein.    She declined admission and was discharged home. Given a rx for Eliquis.    She presents today for recommendations for treatment.    Medical comorbidities include Factor V Leiden with history of DVT (2015, Eliquis for two years, followed by Hem Onc, taking ASA currently), sleep apnea and thyroid disease.    Prior surgeries include partial colectomy with colostomy (as above), hysterectomy.    Family history includes F - lymphoma. S - breast cancer. No uterine, ovarian or colon cancer.

## 2020-07-07 NOTE — PROGRESS NOTES
"Subjective:      Patient ID: Serina Dumas is a 69 y.o. female.    Chief Complaint: Peritoneal Cancer (consult, Dr. Bhat)      HPI     69 yr old referred from Dr. Bhat for recently diagnosed ovarian cancer. Reported history of constipation and diarrhea over the past 6-8 months with a change in stool and bowel frequency and consistency. Saw her PCP recently for LLQ pain. Colonoscopy for history of IBS and colon polyps noted a colonic mass obstructing the recto-sigmoid colon. Biopsies were taken that were benign/granulation tissue.    CT C/A/P 5/26/2020  Circumferential wall thickening of the mid to distal aspect of the sigmoid colon extending to the rectum with diffuse narrowing of the bowel caliber and apparent tethering of the bowel and mesentery. Findings may be related to longstanding inflammation and resulting adhesive disease and/or fistulous tract.  Underlying mass in the differential.  There is a resulting component of obstruction of the more proximal large bowel. Adjacent loculated fluid within the pelvis. Subcentimeter nodularity of the omentum within the anterior aspect of the left hemipelvis.  Metastatic serosal implants are in the differential.    Partial colectomy with colostomy was performed the next day. Per op note, "colon moved into the rectosigmoid junction which was folded multiple times on top of itself stuck to itself with which seemed to be cancer. The patient had multiple retroperitoneal implants and anterior peritoneal wall implants visible" and "patient's rectum was stacked on top of itself and multiple loops compressing a perforation from this cancer an abscess cavity". Liver disease noted as well.    Final pathology   A) peritoneal mucinous material positive for small clusters of malignant cells  B) Left colon with attached rectum - carcinoma involving distal colon at site of dense fibrous adhesions, site of fistula and adjacent abscess   - multiple foci of carcinoma involving omentum " attached to proximal half of specimen   - diverticular disease   - four mesenteric lymph nodes, negative for neoplasm  C) peritoneum biopsy positive for microscopic focus of carcinoma  Comment: tumor morphology and IHC staining patter support thhe exclusion of a large intestine adenocarcinoma primary. After further review, confirmation of a Mullerian origin is supported by the positive ER and PAX-8 stains. Serous carcinomas typically arise from the uterus, ovary or fallopian tube but can also arise as a primary peritoneal serous carcinoma.     is 45  CEA is 1.1    Discharged home POD #5 and presented to [facility] ED last night (7/6/2020) with RLE swelling and pain. LE doppler resulted positive for nonocclusive DVT in the right distal superficial femoral vein with occlusive thrombus extending throughout the popliteal vein extending in the calf into the proximal posterior tibial vein.    She declined admission and was discharged home. Was given heparin and lovenox in ED and a rx for Eliquis which she has not filled.    She presents today for recommendations for treatment. Today she reports SOB with RLE swelling and pain started three days ago. She reports weight loss (226 down to 164lb), nausea, decreased appetite for months.    Medical comorbidities include Factor V Leiden with history of DVT (2015, Eliquis for two years, followed by Hem Onc at [facility], taking ASA currently), sleep apnea and thyroid disease.    Prior surgeries include partial colectomy with colostomy (as above), STACY/BSO.    Family history includes F - lymphoma. S - breast cancer (dec). No uterine, ovarian or colon cancer.      Review of Systems   Constitutional: Positive for activity change, appetite change, fatigue and unexpected weight change. Negative for chills, diaphoresis and fever.   Respiratory: Positive for shortness of breath. Negative for cough, chest tightness and wheezing.    Cardiovascular: Positive for leg swelling (RLE). Negative for  chest pain and palpitations.   Gastrointestinal: Positive for abdominal distention and nausea. Negative for abdominal pain, blood in stool, constipation, diarrhea and vomiting.   Genitourinary: Negative for difficulty urinating, dysuria, flank pain, frequency, hematuria, pelvic pain, vaginal bleeding, vaginal discharge and vaginal pain.   Musculoskeletal: Negative for arthralgias and back pain.   Skin: Positive for color change. Negative for rash.   Neurological: Positive for weakness. Negative for dizziness, numbness and headaches.   Hematological: Negative for adenopathy.   Psychiatric/Behavioral: Negative for confusion and sleep disturbance. The patient is nervous/anxious.        Past Medical History:   Diagnosis Date    Anxiety     Deep vein thrombosis     DVT (deep venous thrombosis)     Factor 5 Leiden mutation, heterozygous     GERD (gastroesophageal reflux disease)     Hyperlipidemia     Hypothyroidism     Macular degeneration     Neuropathy     Primary peritoneal carcinomatosis 7/7/2020    Sleep apnea     Thyroid disease      Past Surgical History:   Procedure Laterality Date    BACK SURGERY      x2 1995 and 2017    COLONOSCOPY N/A 5/26/2020    Procedure: COLONOSCOPY;  Surgeon: Moe Syed MD;  Location: [facility] ENDO;  Service: Endoscopy;  Laterality: N/A;    COLOSTOMY Right 5/27/2020    Procedure: CREATION, COLOSTOMY;  Surgeon: Fei Bhat MD;  Location: [facility] OR;  Service: General;  Laterality: Right;    EXCISIONAL HEMORRHOIDECTOMY N/A 4/25/2019    Procedure: EXCISIONAL HEMORRHOIDECTOMY;  Surgeon: Nicho Meza MD;  [facility];  Service: Colon and Rectal;  Laterality: N/A;    HYSTERECTOMY      SUBTOTAL COLECTOMY N/A 5/27/2020    Procedure: COLECTOMY, PARTIAL;  Surgeon: Fei Bhat MD;  Location: [facility] OR;  Service: General;  Laterality: N/A;  covid negative    TONSILLECTOMY      TYMPANOSTOMY TUBE PLACEMENT       Family History   Problem Relation Age of Onset     Diabetes Mother     Heart disease Mother     Hypertension Mother     Hyperlipidemia Mother     Lymphoma Father     Breast cancer Sister     Rheum arthritis Sister     Ovarian cancer Neg Hx     Uterine cancer Neg Hx     Colon cancer Neg Hx      Social History     Socioeconomic History    Marital status: Single     Spouse name: Not on file    Number of children: 0    Years of education: Not on file    Highest education level: Not on file   Occupational History    Not on file   Social Needs    Financial resource strain: Not on file    Food insecurity     Worry: Not on file     Inability: Not on file    Transportation needs     Medical: Not on file     Non-medical: Not on file   Tobacco Use    Smoking status: Current Every Day Smoker     Packs/day: 0.25     Years: 58.00     Pack years: 14.50     Types: Cigarettes    Smokeless tobacco: Never Used   Substance and Sexual Activity    Alcohol use: Yes     Comment: socially     Drug use: No    Sexual activity: Not Currently   Lifestyle    Physical activity     Days per week: Not on file     Minutes per session: Not on file    Stress: Not on file   Relationships    Social connections     Talks on phone: Not on file     Gets together: Not on file     Attends [employer] service: Not on file     Active member of club or organization: Not on file     Attends meetings of clubs or organizations: Not on file     Relationship status: Not on file   Other Topics Concern    Not on file   Social History Narrative    Not on file     Current Outpatient Medications   Medication Sig    apixaban (ELIQUIS) 5 mg Tab Take 2 tablets (10 mg total) by mouth 2 (two) times daily.    aspirin (ECOTRIN) 81 MG EC tablet Take 81 mg by mouth once daily.    atorvastatin (LIPITOR) 20 MG tablet Take 1 tablet (20 mg total) by mouth once daily.    azelastine (ASTELIN) 137 mcg (0.1 %) nasal spray 1 spray by Nasal route 2 (two) times daily.    BEVESPI AEROSPHERE 9-4.8 mcg HFAA INHALE  2 PUFFS INTO THE LUNGS 2 (TWO) TIMES DAILY. CONTROLLER    buPROPion (WELLBUTRIN XL) 150 MG TB24 tablet Take 1 tablet (150 mg total) by mouth once daily.    chlorthalidone (HYGROTEN) 25 MG Tab Take 1 tablet (25 mg total) by mouth once daily.    dexlansoprazole (DEXILANT) 30 mg CpDM Take 1 capsule (30 mg total) by mouth once daily.    dronabinoL (MARINOL) 2.5 MG capsule Take 1 capsule (2.5 mg total) by mouth 2 (two) times daily.    FLUoxetine 20 MG capsule Take 1 capsule (20 mg total) by mouth once daily.    HYDROcodone-acetaminophen (NORCO) 5-325 mg per tablet Take 1 tablet by mouth every 4 (four) hours as needed.    hydrocortisone (ANUSOL-HC) 2.5 % rectal cream U REC BID FOR 14 DAYS    levocetirizine (XYZAL) 5 MG tablet Take 5 mg by mouth every evening.    levothyroxine (SYNTHROID) 150 MCG tablet Take 1 tablet (150 mcg total) by mouth once daily.    magnesium oxide (MAG-OX) 400 mg (241.3 mg magnesium) tablet Take 1 tablet (400 mg total) by mouth once daily.    menthol-zinc oxide (CALMOSEPTINE) 0.44-20.6 % Oint Apply topically 3 (three) times daily as needed (buttocks redness).    nepafenac (ILEVRO) 0.3 % DrpS Apply to eye once daily.    promethazine (PHENERGAN) 25 MG tablet Take 1 tablet (25 mg total) by mouth every 6 (six) hours as needed for Nausea.    temazepam (RESTORIL) 30 mg capsule Take 1 capsule (30 mg total) by mouth nightly as needed for Insomnia.    tiZANidine (ZANAFLEX) 4 MG tablet Take 1 tablet (4 mg total) by mouth every 6 (six) hours as needed.    vitamin D (VITAMIN D3) 1000 units Tab Take 4,000 Units by mouth once daily.      No current facility-administered medications for this visit.      Facility-Administered Medications Ordered in Other Visits   Medication    mupirocin 2 % ointment     Review of patient's allergies indicates:   Allergen Reactions    Adhesive Itching    Cleocin [clindamycin hcl]     Demerol [meperidine]     Keflex [cephalexin]      /61   Pulse 84   Ht  "5' 7" (1.702 m)   Wt 74.9 kg (165 lb 2 oz)   BMI 25.86 kg/m²     Objective:   Physical Exam:   Constitutional: She is oriented to person, place, and time. She appears well-developed and well-nourished. She appears distressed (work to breath).    HENT:   Head: Normocephalic and atraumatic.    Eyes: No scleral icterus.    Neck: Normal range of motion. Neck supple.    Cardiovascular: Exam reveals edema (R>L). Exam reveals no cyanosis.     Pulmonary/Chest: She is in respiratory distress (appears mildly SOB). She exhibits no tenderness.        Abdominal: Soft. She exhibits distension and abdominal incision. She exhibits no fluid wave and no mass. There is no abdominal tenderness. There is no rebound and no guarding. No hernia.         Genitourinary:    Vagina normal.      Pelvic exam was performed with patient supine.   There is no rash, tenderness or lesion on the right labia. There is no rash, tenderness or lesion on the left labia. Uterus is absent. Right adnexum displays no mass, no tenderness and no fullness. Left adnexum displays no mass, no tenderness and no fullness. No bleeding or unspecified prolapse of vaginal walls in the vagina. Vaginal cuff normal.Cervix exhibits absence.           Musculoskeletal: Normal range of motion and moves all extremeties. Edema (R>L) present.      Lymphadenopathy:     She has no cervical adenopathy.    Neurological: She is alert and oriented to person, place, and time.    Skin: Skin is warm and dry. No cyanosis. There is pallor.    Psychiatric: Her behavior is normal.       Assessment:     1. Chronic deep vein thrombosis (DVT) of distal vein of left lower extremity    2. Primary peritoneal carcinomatosis        Plan:       Long discussion with the patient and her friend who was with her at today's visit regarding the constellation of findings.   Advanced primary peritoneal carcinoma. We discussed these are treated similarly to fallopian tube and ovarian cancers.   Discussed " platinum based chemotherapy with carbo/taxol. She would like to treat locally and is already a patient at [facility] for Factor V. I have told her I would reach out to them assist with treatment planning.   CT CAP for metastatic survey.   Will need IV port, carbo/taxol (would plan for 3 cycles and interval imaging for response), genetic testing.   I will plan to follow along while she treats locally.

## 2020-07-07 NOTE — LETTER
July 12, 2020      Fei Bhat MD  [address] [place] [address]           [facility] [address]  [address]  [facility] [address]  Phone: [phone]  Fax: [phone]          Patient: Serina Dumas   MR Number: [id number]   YOB: 1950   Date of Visit: 7/7/2020       Dear Dr. Fei Bhat:    Thank you for referring Serina Dumas to me for evaluation. Attached you will find relevant portions of my assessment and plan of care.    If you have questions, please do not hesitate to call me. I look forward to following Serina Dumas along with you.    Sincerely,    Marlena Sierra MD    Enclosure  CC:  No Recipients    If you would like to receive this communication electronically, please contact [email or web address] or [phone] to request more information on [employer] access.    For providers and/or their staff who would like to refer a patient to [facility], please contact us through our one-stop-shop provider referral line, [facility], at [phone].    If you feel you have received this communication in error or would no longer like to receive these types of communications, please e-mail [email or web address]

## 2020-07-08 ENCOUNTER — TELEPHONE (OUTPATIENT)
Dept: GYNECOLOGIC ONCOLOGY | Facility: CLINIC | Age: 70
End: 2020-07-08

## 2020-07-08 DIAGNOSIS — C48.2 PRIMARY PERITONEAL CARCINOMATOSIS: Primary | ICD-10-CM

## 2020-07-08 NOTE — TELEPHONE ENCOUNTER
----- Message from Prasanna Gonzalez MA sent at 7/8/2020  9:39 AM CDT -----  Contact: BING LUNA [[id number]]  Hi, I spoke with the patient and [facility] they will need the order changed to individual orders for chest, abdomen  and pelvis to reschedule her scan. Thanks   ----- Message -----  From: Mirna Maddox RN  Sent: 7/8/2020   8:58 AM CDT  To: BELINDA Rodas,  Please reschedule when you are able.  Thank you.   ----- Message -----  From: Ashley Escalante  Sent: 7/8/2020   8:38 AM CDT  To: Rigo Mendiola Staff    Type: Patient Call Back    Who called: BING LUNA [[id number]]    What is the request in detail: Patient is requesting a call back in regards to her CT scan. She states that she would like to change the location of the scan. [place].    Please advise.    Can the clinic reply by [facility]? No    Best call back number: [phone]    Additional Information: N/A

## 2020-07-08 NOTE — TELEPHONE ENCOUNTER
----- Message from Shea Waters NP sent at 7/8/2020 10:58 AM CDT -----  Regarding: CT scheduling  Prasanna,  I changed the order to CT chest and CT abd pelvis if you can please schedule her at [facility].  Thanks,  Shea

## 2020-07-13 PROBLEM — I26.99 ACUTE PULMONARY EMBOLISM WITHOUT ACUTE COR PULMONALE: Status: ACTIVE | Noted: 2020-07-13

## 2020-07-16 PROBLEM — E86.0 DEHYDRATION, MODERATE: Status: ACTIVE | Noted: 2020-07-16

## 2020-10-06 ENCOUNTER — TELEPHONE (OUTPATIENT)
Dept: GYNECOLOGIC ONCOLOGY | Facility: CLINIC | Age: 70
End: 2020-10-06

## 2020-10-07 PROBLEM — D64.81 ANEMIA DUE TO ANTINEOPLASTIC CHEMOTHERAPY: Status: ACTIVE | Noted: 2020-10-07

## 2020-10-07 PROBLEM — T45.1X5A LEUKOPENIA DUE TO ANTINEOPLASTIC CHEMOTHERAPY: Status: ACTIVE | Noted: 2020-10-07

## 2020-10-07 PROBLEM — G62.0 PERIPHERAL NEUROPATHY DUE TO CHEMOTHERAPY: Status: ACTIVE | Noted: 2020-10-07

## 2020-10-07 PROBLEM — T45.1X5A PERIPHERAL NEUROPATHY DUE TO CHEMOTHERAPY: Status: ACTIVE | Noted: 2020-10-07

## 2020-10-07 PROBLEM — T45.1X5A ANEMIA DUE TO ANTINEOPLASTIC CHEMOTHERAPY: Status: ACTIVE | Noted: 2020-10-07

## 2020-10-07 PROBLEM — N28.9 RENAL INSUFFICIENCY: Status: ACTIVE | Noted: 2020-10-07

## 2020-10-07 PROBLEM — D70.1 LEUKOPENIA DUE TO ANTINEOPLASTIC CHEMOTHERAPY: Status: ACTIVE | Noted: 2020-10-07

## 2020-11-05 ENCOUNTER — OFFICE VISIT (OUTPATIENT)
Dept: GYNECOLOGIC ONCOLOGY | Facility: CLINIC | Age: 70
End: 2020-11-05
Payer: MEDICARE

## 2020-11-05 DIAGNOSIS — C48.2 PRIMARY PERITONEAL CARCINOMATOSIS: Primary | ICD-10-CM

## 2020-11-05 PROCEDURE — 99214 OFFICE O/P EST MOD 30 MIN: CPT | Mod: 95,,, | Performed by: OBSTETRICS & GYNECOLOGY

## 2020-11-05 PROCEDURE — 99214 PR OFFICE/OUTPT VISIT, EST, LEVL IV, 30-39 MIN: ICD-10-PCS | Mod: 95,,, | Performed by: OBSTETRICS & GYNECOLOGY

## 2020-11-05 PROCEDURE — 1159F MED LIST DOCD IN RCRD: CPT | Mod: 95,,, | Performed by: OBSTETRICS & GYNECOLOGY

## 2020-11-05 PROCEDURE — 1159F PR MEDICATION LIST DOCUMENTED IN MEDICAL RECORD: ICD-10-PCS | Mod: 95,,, | Performed by: OBSTETRICS & GYNECOLOGY

## 2020-11-05 NOTE — PROGRESS NOTES
"Subjective:      Patient ID: Serina Dumas is a 70 y.o. female.    Chief Complaint: No chief complaint on file.      HPI  Presents today for follow up.   S/p 6 cycles carbo/taxol with local hem onc  Pretreatment  elevated, now normalized 22  10/2020 CT CAP shows no evidence of disease   10/2020 gene path results negative for BRCA1/2 and LOR; negative for other targetable mutations   DVT on lovenox    Referral history:  69 yr old referred from Dr. Bhat for recently diagnosed ovarian cancer. Reported history of constipation and diarrhea over the past 6-8 months with a change in stool and bowel frequency and consistency. Saw her PCP recently for LLQ pain. Colonoscopy for history of IBS and colon polyps noted a colonic mass obstructing the recto-sigmoid colon. Biopsies were taken that were benign/granulation tissue.     CT C/A/P 5/26/2020  Circumferential wall thickening of the mid to distal aspect of the sigmoid colon extending to the rectum with diffuse narrowing of the bowel caliber and apparent tethering of the bowel and mesentery. Findings may be related to longstanding inflammation and resulting adhesive disease and/or fistulous tract.  Underlying mass in the differential.  There is a resulting component of obstruction of the more proximal large bowel. Adjacent loculated fluid within the pelvis. Subcentimeter nodularity of the omentum within the anterior aspect of the left hemipelvis.  Metastatic serosal implants are in the differential.     Partial colectomy with colostomy was performed the next day. Per op note, "colon moved into the rectosigmoid junction which was folded multiple times on top of itself stuck to itself with which seemed to be cancer. The patient had multiple retroperitoneal implants and anterior peritoneal wall implants visible" and "patient's rectum was stacked on top of itself and multiple loops compressing a perforation from this cancer an abscess cavity". Liver disease noted as " well.     Final pathology   A) peritoneal mucinous material positive for small clusters of malignant cells  B) Left colon with attached rectum - carcinoma involving distal colon at site of dense fibrous adhesions, site of fistula and adjacent abscess              - multiple foci of carcinoma involving omentum attached to proximal half of specimen              - diverticular disease              - four mesenteric lymph nodes, negative for neoplasm  C) peritoneum biopsy positive for microscopic focus of carcinoma  Comment: tumor morphology and IHC staining patter support thhe exclusion of a large intestine adenocarcinoma primary. After further review, confirmation of a Mullerian origin is supported by the positive ER and PAX-8 stains. Serous carcinomas typically arise from the uterus, ovary or fallopian tube but can also arise as a primary peritoneal serous carcinoma.      is 45  CEA is 1.1     Discharged home POD #5 and [place] to [facility] [place] last night (7/6/2020) with RLE swelling and pain. LE doppler resulted positive for nonocclusive DVT in the right distal superficial femoral vein with occlusive thrombus extending throughout the popliteal vein extending in the calf into the proximal posterior tibial vein.     She declined admission and was discharged home. Was given heparin and lovenox in ED and a rx for Eliquis which she has not filled.     She presents today for recommendations for treatment. Today she reports SOB with RLE swelling and pain started three days ago. She reports weight loss (226 down to 164lb), nausea, decreased appetite for months.     Medical comorbidities include Factor V Leiden with history of DVT (2015, Eliquis for two years, followed by Hem Onc at [facility], taking ASA currently), sleep apnea and thyroid disease.     Prior surgeries include partial colectomy with colostomy (as above), STACY/BSO.     Family history includes F - lymphoma. S - breast cancer (dec). No uterine, ovarian or colon  cancer.  Review of Systems   Constitutional: Negative for appetite change, chills, fatigue and fever.   HENT: Negative for mouth sores.    Respiratory: Negative for cough and shortness of breath.    Cardiovascular: Negative for leg swelling.   Gastrointestinal: Negative for abdominal pain, blood in stool, constipation and diarrhea.   Endocrine: Negative for cold intolerance.   Genitourinary: Negative for dysuria and vaginal bleeding.   Musculoskeletal: Negative for myalgias.   Skin: Negative for rash.   Allergic/Immunologic: Negative.    Neurological: Negative for weakness and numbness.   Hematological: Negative for adenopathy. Does not bruise/bleed easily.   Psychiatric/Behavioral: Negative for confusion.       Objective:   Physical Exam:   Constitutional: She is oriented to person, place, and time. She appears well-developed and well-nourished.    HENT:   Head: Normocephalic and atraumatic.    Eyes: Pupils are equal, round, and reactive to light. EOM are normal.    Neck: Normal range of motion. Neck supple. No thyromegaly present.    Cardiovascular: Normal rate, regular rhythm and intact distal pulses.     Pulmonary/Chest: Effort normal and breath sounds normal. No respiratory distress. She has no wheezes.        Abdominal: Soft. Bowel sounds are normal. She exhibits no distension and no mass. There is no abdominal tenderness.             Musculoskeletal: Normal range of motion and moves all extremeties.      Lymphadenopathy:     She has no cervical adenopathy.        Right: No supraclavicular adenopathy present.        Left: No supraclavicular adenopathy present.    Neurological: She is alert and oriented to person, place, and time.    Skin: Skin is warm and dry. No rash noted.    Psychiatric: She has a normal mood and affect.       Assessment:     1. Primary peritoneal carcinomatosis        Plan:   No orders of the defined types were placed in this encounter.    Complete response to therapy by imaging and  .  Prior STACY/BSO and there does not appear to be any evidence of residual disease for debulking.   Recommend maintenance therapy with parpi and she will initiate this locally.     RTC 3 months with  for follow up.     I spent approximately 30 minutes reviewing the available records and evaluating the patient, out of which over 50% of the time was spent face to face with the patient in counseling and coordinating this patient's care.

## 2020-12-23 PROBLEM — Z85.038 HISTORY OF COLON CANCER: Status: ACTIVE | Noted: 2020-12-23

## 2021-01-04 PROBLEM — Z85.038 HISTORY OF COLON CANCER: Status: RESOLVED | Noted: 2020-12-23 | Resolved: 2021-01-04

## 2021-01-29 ENCOUNTER — PATIENT MESSAGE (OUTPATIENT)
Dept: GYNECOLOGIC ONCOLOGY | Facility: CLINIC | Age: 71
End: 2021-01-29

## 2021-02-01 ENCOUNTER — TELEPHONE (OUTPATIENT)
Dept: GYNECOLOGIC ONCOLOGY | Facility: CLINIC | Age: 71
End: 2021-02-01

## 2021-02-05 ENCOUNTER — TELEPHONE (OUTPATIENT)
Dept: GYNECOLOGIC ONCOLOGY | Facility: CLINIC | Age: 71
End: 2021-02-05

## 2021-02-08 ENCOUNTER — TELEPHONE (OUTPATIENT)
Dept: GYNECOLOGIC ONCOLOGY | Facility: CLINIC | Age: 71
End: 2021-02-08

## 2021-03-26 PROBLEM — N18.4 STAGE 4 CHRONIC KIDNEY DISEASE: Status: ACTIVE | Noted: 2021-03-26

## 2021-04-06 ENCOUNTER — OFFICE VISIT (OUTPATIENT)
Dept: GYNECOLOGIC ONCOLOGY | Facility: CLINIC | Age: 71
End: 2021-04-06
Payer: MEDICARE

## 2021-04-06 DIAGNOSIS — C48.2 PRIMARY PERITONEAL CARCINOMATOSIS: Primary | ICD-10-CM

## 2021-04-06 PROCEDURE — 1159F PR MEDICATION LIST DOCUMENTED IN MEDICAL RECORD: ICD-10-PCS | Mod: 95,,, | Performed by: OBSTETRICS & GYNECOLOGY

## 2021-04-06 PROCEDURE — 99214 OFFICE O/P EST MOD 30 MIN: CPT | Mod: 95,,, | Performed by: OBSTETRICS & GYNECOLOGY

## 2021-04-06 PROCEDURE — 1159F MED LIST DOCD IN RCRD: CPT | Mod: 95,,, | Performed by: OBSTETRICS & GYNECOLOGY

## 2021-04-06 PROCEDURE — 99214 PR OFFICE/OUTPT VISIT, EST, LEVL IV, 30-39 MIN: ICD-10-PCS | Mod: 95,,, | Performed by: OBSTETRICS & GYNECOLOGY

## 2021-04-08 PROBLEM — K56.609 SMALL BOWEL OBSTRUCTION: Status: ACTIVE | Noted: 2021-04-08

## 2021-04-11 PROBLEM — Z00.00 HEALTH CARE MAINTENANCE: Status: ACTIVE | Noted: 2021-04-11

## 2021-04-11 PROBLEM — E03.9 HYPOTHYROID: Status: ACTIVE | Noted: 2021-04-11

## 2021-04-11 PROBLEM — N18.31 STAGE 3A CHRONIC KIDNEY DISEASE: Status: ACTIVE | Noted: 2021-04-11

## 2021-04-11 PROBLEM — D64.9 NORMOCYTIC ANEMIA: Status: ACTIVE | Noted: 2021-04-11

## 2021-04-11 PROBLEM — F33.2 MDD (MAJOR DEPRESSIVE DISORDER), RECURRENT SEVERE, WITHOUT PSYCHOSIS: Status: ACTIVE | Noted: 2021-04-11

## 2021-04-11 PROBLEM — C56.9: Status: ACTIVE | Noted: 2021-04-11

## 2021-04-11 PROBLEM — C78.6: Status: ACTIVE | Noted: 2021-04-11

## 2021-04-12 ENCOUNTER — TELEPHONE (OUTPATIENT)
Dept: GYNECOLOGIC ONCOLOGY | Facility: CLINIC | Age: 71
End: 2021-04-12

## 2021-04-20 PROBLEM — E78.5 HYPERLIPIDEMIA: Status: ACTIVE | Noted: 2021-04-20

## 2021-05-06 ENCOUNTER — PATIENT MESSAGE (OUTPATIENT)
Dept: RESEARCH | Facility: HOSPITAL | Age: 71
End: 2021-05-06

## 2021-05-10 ENCOUNTER — PATIENT MESSAGE (OUTPATIENT)
Dept: RESEARCH | Facility: HOSPITAL | Age: 71
End: 2021-05-10

## 2021-05-11 ENCOUNTER — PATIENT MESSAGE (OUTPATIENT)
Dept: GYNECOLOGIC ONCOLOGY | Facility: CLINIC | Age: 71
End: 2021-05-11

## 2021-05-18 ENCOUNTER — PATIENT MESSAGE (OUTPATIENT)
Dept: GYNECOLOGIC ONCOLOGY | Facility: CLINIC | Age: 71
End: 2021-05-18

## 2021-05-24 ENCOUNTER — PATIENT MESSAGE (OUTPATIENT)
Dept: GYNECOLOGIC ONCOLOGY | Facility: CLINIC | Age: 71
End: 2021-05-24

## 2021-05-24 ENCOUNTER — TELEPHONE (OUTPATIENT)
Dept: GYNECOLOGIC ONCOLOGY | Facility: CLINIC | Age: 71
End: 2021-05-24

## 2021-05-25 ENCOUNTER — OFFICE VISIT (OUTPATIENT)
Dept: GYNECOLOGIC ONCOLOGY | Facility: CLINIC | Age: 71
End: 2021-05-25
Payer: MEDICARE

## 2021-05-25 DIAGNOSIS — C48.2 PRIMARY PERITONEAL CARCINOMATOSIS: Primary | ICD-10-CM

## 2021-05-25 PROCEDURE — 99213 OFFICE O/P EST LOW 20 MIN: CPT | Mod: 95,,, | Performed by: OBSTETRICS & GYNECOLOGY

## 2021-05-25 PROCEDURE — 1159F PR MEDICATION LIST DOCUMENTED IN MEDICAL RECORD: ICD-10-PCS | Mod: 95,,, | Performed by: OBSTETRICS & GYNECOLOGY

## 2021-05-25 PROCEDURE — 99213 PR OFFICE/OUTPT VISIT, EST, LEVL III, 20-29 MIN: ICD-10-PCS | Mod: 95,,, | Performed by: OBSTETRICS & GYNECOLOGY

## 2021-05-25 PROCEDURE — 1159F MED LIST DOCD IN RCRD: CPT | Mod: 95,,, | Performed by: OBSTETRICS & GYNECOLOGY

## 2021-06-04 ENCOUNTER — TELEPHONE (OUTPATIENT)
Dept: GYNECOLOGIC ONCOLOGY | Facility: CLINIC | Age: 71
End: 2021-06-04

## 2021-07-12 PROBLEM — Z00.00 HEALTH CARE MAINTENANCE: Status: RESOLVED | Noted: 2021-04-11 | Resolved: 2021-07-12

## 2021-07-15 ENCOUNTER — OFFICE VISIT (OUTPATIENT)
Dept: GYNECOLOGIC ONCOLOGY | Facility: CLINIC | Age: 71
End: 2021-07-15
Payer: MEDICARE

## 2021-07-15 VITALS
SYSTOLIC BLOOD PRESSURE: 168 MMHG | WEIGHT: 191 LBS | HEART RATE: 74 BPM | DIASTOLIC BLOOD PRESSURE: 74 MMHG | BODY MASS INDEX: 29.91 KG/M2

## 2021-07-15 DIAGNOSIS — C48.2 PRIMARY PERITONEAL CARCINOMATOSIS: Primary | ICD-10-CM

## 2021-07-15 PROCEDURE — 1126F PR PAIN SEVERITY QUANTIFIED, NO PAIN PRESENT: ICD-10-PCS | Mod: S$GLB,,, | Performed by: OBSTETRICS & GYNECOLOGY

## 2021-07-15 PROCEDURE — 1101F PT FALLS ASSESS-DOCD LE1/YR: CPT | Mod: CPTII,S$GLB,, | Performed by: OBSTETRICS & GYNECOLOGY

## 2021-07-15 PROCEDURE — 1126F AMNT PAIN NOTED NONE PRSNT: CPT | Mod: S$GLB,,, | Performed by: OBSTETRICS & GYNECOLOGY

## 2021-07-15 PROCEDURE — 3288F PR FALLS RISK ASSESSMENT DOCUMENTED: ICD-10-PCS | Mod: CPTII,S$GLB,, | Performed by: OBSTETRICS & GYNECOLOGY

## 2021-07-15 PROCEDURE — 1159F MED LIST DOCD IN RCRD: CPT | Mod: S$GLB,,, | Performed by: OBSTETRICS & GYNECOLOGY

## 2021-07-15 PROCEDURE — 99214 OFFICE O/P EST MOD 30 MIN: CPT | Mod: S$GLB,,, | Performed by: OBSTETRICS & GYNECOLOGY

## 2021-07-15 PROCEDURE — 1159F PR MEDICATION LIST DOCUMENTED IN MEDICAL RECORD: ICD-10-PCS | Mod: S$GLB,,, | Performed by: OBSTETRICS & GYNECOLOGY

## 2021-07-15 PROCEDURE — 3008F BODY MASS INDEX DOCD: CPT | Mod: CPTII,S$GLB,, | Performed by: OBSTETRICS & GYNECOLOGY

## 2021-07-15 PROCEDURE — 3288F FALL RISK ASSESSMENT DOCD: CPT | Mod: CPTII,S$GLB,, | Performed by: OBSTETRICS & GYNECOLOGY

## 2021-07-15 PROCEDURE — 99214 PR OFFICE/OUTPT VISIT, EST, LEVL IV, 30-39 MIN: ICD-10-PCS | Mod: S$GLB,,, | Performed by: OBSTETRICS & GYNECOLOGY

## 2021-07-15 PROCEDURE — 99999 PR PBB SHADOW E&M-EST. PATIENT-LVL III: CPT | Mod: PBBFAC,,, | Performed by: OBSTETRICS & GYNECOLOGY

## 2021-07-15 PROCEDURE — 3008F PR BODY MASS INDEX (BMI) DOCUMENTED: ICD-10-PCS | Mod: CPTII,S$GLB,, | Performed by: OBSTETRICS & GYNECOLOGY

## 2021-07-15 PROCEDURE — 1101F PR PT FALLS ASSESS DOC 0-1 FALLS W/OUT INJ PAST YR: ICD-10-PCS | Mod: CPTII,S$GLB,, | Performed by: OBSTETRICS & GYNECOLOGY

## 2021-07-15 PROCEDURE — 99999 PR PBB SHADOW E&M-EST. PATIENT-LVL III: ICD-10-PCS | Mod: PBBFAC,,, | Performed by: OBSTETRICS & GYNECOLOGY

## 2021-08-07 PROBLEM — K56.600 PARTIAL SMALL BOWEL OBSTRUCTION: Status: ACTIVE | Noted: 2021-08-07

## 2021-08-09 ENCOUNTER — PATIENT MESSAGE (OUTPATIENT)
Dept: GYNECOLOGIC ONCOLOGY | Facility: CLINIC | Age: 71
End: 2021-08-09

## 2021-08-09 PROBLEM — Z79.899 POLYPHARMACY: Status: ACTIVE | Noted: 2021-08-09

## 2021-08-09 PROBLEM — I10 BENIGN ESSENTIAL HTN: Status: ACTIVE | Noted: 2021-08-09

## 2021-08-09 PROBLEM — F13.20 BENZODIAZEPINE DEPENDENCE: Status: ACTIVE | Noted: 2021-08-09

## 2021-08-09 PROBLEM — K43.5 PARASTOMAL HERNIA WITHOUT OBSTRUCTION OR GANGRENE: Status: ACTIVE | Noted: 2021-08-09

## 2021-08-09 PROBLEM — F33.1 MDD (MAJOR DEPRESSIVE DISORDER), RECURRENT EPISODE, MODERATE: Status: ACTIVE | Noted: 2021-04-11

## 2021-08-09 PROBLEM — C56.9 OVARIAN CANCER: Status: ACTIVE | Noted: 2021-08-09

## 2021-08-09 PROBLEM — C78.6 PERITONEAL CARCINOMATOSIS: Status: ACTIVE | Noted: 2020-07-07

## 2021-09-29 ENCOUNTER — PATIENT MESSAGE (OUTPATIENT)
Dept: GYNECOLOGIC ONCOLOGY | Facility: CLINIC | Age: 71
End: 2021-09-29

## 2021-10-20 ENCOUNTER — OFFICE VISIT (OUTPATIENT)
Dept: GYNECOLOGIC ONCOLOGY | Facility: CLINIC | Age: 71
End: 2021-10-20
Payer: MEDICARE

## 2021-10-20 VITALS
HEART RATE: 77 BPM | BODY MASS INDEX: 30.7 KG/M2 | DIASTOLIC BLOOD PRESSURE: 67 MMHG | SYSTOLIC BLOOD PRESSURE: 150 MMHG | WEIGHT: 196 LBS

## 2021-10-20 DIAGNOSIS — C78.6 PERITONEAL CARCINOMATOSIS: Primary | ICD-10-CM

## 2021-10-20 PROCEDURE — 1101F PT FALLS ASSESS-DOCD LE1/YR: CPT | Mod: CPTII,S$GLB,, | Performed by: OBSTETRICS & GYNECOLOGY

## 2021-10-20 PROCEDURE — 99999 PR PBB SHADOW E&M-EST. PATIENT-LVL II: ICD-10-PCS | Mod: PBBFAC,,, | Performed by: OBSTETRICS & GYNECOLOGY

## 2021-10-20 PROCEDURE — 3077F SYST BP >= 140 MM HG: CPT | Mod: CPTII,S$GLB,, | Performed by: OBSTETRICS & GYNECOLOGY

## 2021-10-20 PROCEDURE — 3288F FALL RISK ASSESSMENT DOCD: CPT | Mod: CPTII,S$GLB,, | Performed by: OBSTETRICS & GYNECOLOGY

## 2021-10-20 PROCEDURE — 1159F MED LIST DOCD IN RCRD: CPT | Mod: CPTII,S$GLB,, | Performed by: OBSTETRICS & GYNECOLOGY

## 2021-10-20 PROCEDURE — 3078F DIAST BP <80 MM HG: CPT | Mod: CPTII,S$GLB,, | Performed by: OBSTETRICS & GYNECOLOGY

## 2021-10-20 PROCEDURE — 1159F PR MEDICATION LIST DOCUMENTED IN MEDICAL RECORD: ICD-10-PCS | Mod: CPTII,S$GLB,, | Performed by: OBSTETRICS & GYNECOLOGY

## 2021-10-20 PROCEDURE — 3008F PR BODY MASS INDEX (BMI) DOCUMENTED: ICD-10-PCS | Mod: CPTII,S$GLB,, | Performed by: OBSTETRICS & GYNECOLOGY

## 2021-10-20 PROCEDURE — 3008F BODY MASS INDEX DOCD: CPT | Mod: CPTII,S$GLB,, | Performed by: OBSTETRICS & GYNECOLOGY

## 2021-10-20 PROCEDURE — 1160F PR REVIEW ALL MEDS BY PRESCRIBER/CLIN PHARMACIST DOCUMENTED: ICD-10-PCS | Mod: CPTII,S$GLB,, | Performed by: OBSTETRICS & GYNECOLOGY

## 2021-10-20 PROCEDURE — 1101F PR PT FALLS ASSESS DOC 0-1 FALLS W/OUT INJ PAST YR: ICD-10-PCS | Mod: CPTII,S$GLB,, | Performed by: OBSTETRICS & GYNECOLOGY

## 2021-10-20 PROCEDURE — 1126F AMNT PAIN NOTED NONE PRSNT: CPT | Mod: CPTII,S$GLB,, | Performed by: OBSTETRICS & GYNECOLOGY

## 2021-10-20 PROCEDURE — 3288F PR FALLS RISK ASSESSMENT DOCUMENTED: ICD-10-PCS | Mod: CPTII,S$GLB,, | Performed by: OBSTETRICS & GYNECOLOGY

## 2021-10-20 PROCEDURE — 3078F PR MOST RECENT DIASTOLIC BLOOD PRESSURE < 80 MM HG: ICD-10-PCS | Mod: CPTII,S$GLB,, | Performed by: OBSTETRICS & GYNECOLOGY

## 2021-10-20 PROCEDURE — 99214 PR OFFICE/OUTPT VISIT, EST, LEVL IV, 30-39 MIN: ICD-10-PCS | Mod: S$GLB,,, | Performed by: OBSTETRICS & GYNECOLOGY

## 2021-10-20 PROCEDURE — 99999 PR PBB SHADOW E&M-EST. PATIENT-LVL II: CPT | Mod: PBBFAC,,, | Performed by: OBSTETRICS & GYNECOLOGY

## 2021-10-20 PROCEDURE — 99214 OFFICE O/P EST MOD 30 MIN: CPT | Mod: S$GLB,,, | Performed by: OBSTETRICS & GYNECOLOGY

## 2021-10-20 PROCEDURE — 3077F PR MOST RECENT SYSTOLIC BLOOD PRESSURE >= 140 MM HG: ICD-10-PCS | Mod: CPTII,S$GLB,, | Performed by: OBSTETRICS & GYNECOLOGY

## 2021-10-20 PROCEDURE — 1126F PR PAIN SEVERITY QUANTIFIED, NO PAIN PRESENT: ICD-10-PCS | Mod: CPTII,S$GLB,, | Performed by: OBSTETRICS & GYNECOLOGY

## 2021-10-20 PROCEDURE — 1160F RVW MEDS BY RX/DR IN RCRD: CPT | Mod: CPTII,S$GLB,, | Performed by: OBSTETRICS & GYNECOLOGY

## 2021-10-20 RX ORDER — CIPROFLOXACIN HYDROCHLORIDE 3 MG/ML
SOLUTION/ DROPS OPHTHALMIC
COMMUNITY
Start: 2021-10-02 | End: 2021-11-01 | Stop reason: ALTCHOICE

## 2021-10-20 RX ORDER — BUPROPION HYDROCHLORIDE 150 MG/1
150 TABLET ORAL DAILY
COMMUNITY
Start: 2021-10-09 | End: 2022-02-25

## 2021-11-02 PROBLEM — N17.9 AKI (ACUTE KIDNEY INJURY): Status: ACTIVE | Noted: 2021-11-02

## 2021-11-02 PROBLEM — Z93.9 HISTORY OF CREATION OF OSTOMY: Status: ACTIVE | Noted: 2021-11-02

## 2021-11-02 PROBLEM — Z79.899 POLYPHARMACY: Status: ACTIVE | Noted: 2021-11-02

## 2021-11-02 PROBLEM — N18.32 STAGE 3B CHRONIC KIDNEY DISEASE: Status: ACTIVE | Noted: 2021-03-26

## 2021-11-04 PROBLEM — K56.600 PARTIAL SMALL BOWEL OBSTRUCTION: Status: RESOLVED | Noted: 2021-08-07 | Resolved: 2021-11-04

## 2021-11-04 PROBLEM — N17.9 AKI (ACUTE KIDNEY INJURY): Status: RESOLVED | Noted: 2021-11-02 | Resolved: 2021-11-04

## 2022-01-31 ENCOUNTER — TELEPHONE (OUTPATIENT)
Dept: GYNECOLOGIC ONCOLOGY | Facility: CLINIC | Age: 72
End: 2022-01-31
Payer: MEDICARE

## 2022-03-09 ENCOUNTER — OFFICE VISIT (OUTPATIENT)
Dept: GYNECOLOGIC ONCOLOGY | Facility: CLINIC | Age: 72
End: 2022-03-09
Payer: MEDICARE

## 2022-03-09 DIAGNOSIS — C78.6 PERITONEAL CARCINOMATOSIS: Primary | ICD-10-CM

## 2022-03-09 PROCEDURE — 99214 PR OFFICE/OUTPT VISIT, EST, LEVL IV, 30-39 MIN: ICD-10-PCS | Mod: 95,,, | Performed by: OBSTETRICS & GYNECOLOGY

## 2022-03-09 PROCEDURE — 1159F PR MEDICATION LIST DOCUMENTED IN MEDICAL RECORD: ICD-10-PCS | Mod: CPTII,95,, | Performed by: OBSTETRICS & GYNECOLOGY

## 2022-03-09 PROCEDURE — 1160F PR REVIEW ALL MEDS BY PRESCRIBER/CLIN PHARMACIST DOCUMENTED: ICD-10-PCS | Mod: CPTII,95,, | Performed by: OBSTETRICS & GYNECOLOGY

## 2022-03-09 PROCEDURE — 99214 OFFICE O/P EST MOD 30 MIN: CPT | Mod: 95,,, | Performed by: OBSTETRICS & GYNECOLOGY

## 2022-03-09 PROCEDURE — 1160F RVW MEDS BY RX/DR IN RCRD: CPT | Mod: CPTII,95,, | Performed by: OBSTETRICS & GYNECOLOGY

## 2022-03-09 PROCEDURE — 1159F MED LIST DOCD IN RCRD: CPT | Mod: CPTII,95,, | Performed by: OBSTETRICS & GYNECOLOGY

## 2022-03-09 NOTE — PROGRESS NOTES
Subjective:      Patient ID: Serina Dumas is a 71 y.o. female.    Chief Complaint: No chief complaint on file.    The patient location is: Home  The chief complaint leading to consultation is: follow up    Visit type: audiovisual    Face to Face time with patient: 15min  30 minutes of total time spent on the encounter, which includes face to face time and non-face to face time preparing to see the patient (eg, review of tests), Obtaining and/or reviewing separately obtained history, Documenting clinical information in the electronic or other health record, Independently interpreting results (not separately reported) and communicating results to the patient/family/caregiver, or Care coordination (not separately reported).         Each patient to whom he or she provides medical services by telemedicine is:  (1) informed of the relationship between the physician and patient and the respective role of any other health care provider with respect to management of the patient; and (2) notified that he or she may decline to receive medical services by telemedicine and may withdraw from such care at any time.    Notes:     HPI  S/p 6 cycles carbo/taxol with local hem onc  Pretreatment  elevated, now normalized 22  10/2020 CT CAP shows no evidence of disease   10/2020 gene path results negative for BRCA1/2 and LOR; negative for other targetable mutations   DVT on lovenox (prior DVT while on Eliquis)  Complete response to therapy by imaging and .  Prior STACY/BSO and there does not appear to be any evidence of residual disease for debulking.   Recommend maintenance therapy with parpi and she will initiate this locally 12/2020.   Dose reduced for G3 toxicity, thrombocytopenia and anemia. Zejula 100mg   CT CAP 7/14/2021 shows no detrimental changes, subtle ground glass changes in lung. Denies SOB, cough or other pulmonary issues.       15> 11 (9/2021)  She had had one admission for pSBO that resolved with  "conservative management.  CT from that admission 8/2021 does not show any detrimental changes from a malignancy standpoint  Impression:  1. A right-sided colostomy with a parastomal hernia containing several small bowel loops, some of which are mildly dilated fluid-filled and 1 is thickened, may reflect a partial small bowel obstruction with enteritis.  2. No free fluid or free air.     Today's visit:  Currently on Zejula 100mg maintenance   Doing well with current dose. Without complaints.   Presents today for follow up.   Having a breast biopsy for abnormal mammogram finding (of note, genetic testing negative, in media tab).   CT 11/2021 showed no evidence of disease.    2/25/2022 15, normal and stable.           History:  69 yr old referred from Dr. Bhat for recently diagnosed ovarian cancer. Reported history of constipation and diarrhea over the past 6-8 months with a change in stool and bowel frequency and consistency. Saw her PCP recently for LLQ pain. Colonoscopy for history of IBS and colon polyps noted a colonic mass obstructing the recto-sigmoid colon. Biopsies were taken that were benign/granulation tissue.     CT C/A/P 5/26/2020  Circumferential wall thickening of the mid to distal aspect of the sigmoid colon extending to the rectum with diffuse narrowing of the bowel caliber and apparent tethering of the bowel and mesentery. Findings may be related to longstanding inflammation and resulting adhesive disease and/or fistulous tract.  Underlying mass in the differential.  There is a resulting component of obstruction of the more proximal large bowel. Adjacent loculated fluid within the pelvis. Subcentimeter nodularity of the omentum within the anterior aspect of the left hemipelvis.  Metastatic serosal implants are in the differential.     Partial colectomy with colostomy was performed the next day. Per op note, "colon moved into the rectosigmoid junction which was folded multiple times on top of " "itself stuck to itself with which seemed to be cancer. The patient had multiple retroperitoneal implants and anterior peritoneal wall implants visible" and "patient's rectum was stacked on top of itself and multiple loops compressing a perforation from this cancer an abscess cavity". Liver disease noted as well.     Final pathology   A) peritoneal mucinous material positive for small clusters of malignant cells  B) Left colon with attached rectum - carcinoma involving distal colon at site of dense fibrous adhesions, site of fistula and adjacent abscess              - multiple foci of carcinoma involving omentum attached to proximal half of specimen              - diverticular disease              - four mesenteric lymph nodes, negative for neoplasm  C) peritoneum biopsy positive for microscopic focus of carcinoma  Comment: tumor morphology and IHC staining patter support thhe exclusion of a large intestine adenocarcinoma primary. After further review, confirmation of a Mullerian origin is supported by the positive ER and PAX-8 stains. Serous carcinomas typically arise from the uterus, ovary or fallopian tube but can also arise as a primary peritoneal serous carcinoma.      is 45  CEA is 1.1     Discharged home POD #5 and presented to [facility] [place] ED last night (7/6/2020) with RLE swelling and pain. LE doppler resulted positive for nonocclusive DVT in the right distal superficial femoral vein with occlusive thrombus extending throughout the popliteal vein extending in the calf into the proximal posterior tibial vein.     She declined admission and was discharged home. Was given heparin and lovenox in ED and a rx for Eliquis which she has not filled.     She presents today for recommendations for treatment. Today she reports SOB with RLE swelling and pain started three days ago. She reports weight loss (226 down to 164lb), nausea, decreased appetite for months.     Medical comorbidities include Factor V Leiden with " history of DVT (2015, Eliquis for two years, followed by Hem Onc at [facility], taking ASA currently), sleep apnea and thyroid disease.     Prior surgeries include partial colectomy with colostomy (as above), STACY/BSO.     Family history includes F - lymphoma. S - breast cancer (dec). No uterine, ovarian or colon cancer.  Review of Systems   Constitutional: Negative for appetite change, chills, fatigue and fever.   HENT: Negative for mouth sores.    Respiratory: Negative for cough and shortness of breath.    Cardiovascular: Negative for leg swelling.   Gastrointestinal: Negative for abdominal pain, blood in stool, constipation and diarrhea.   Endocrine: Negative for cold intolerance.   Genitourinary: Negative for dysuria and vaginal bleeding.   Musculoskeletal: Negative for myalgias.   Skin: Negative for rash.   Allergic/Immunologic: Negative.    Neurological: Negative for weakness and numbness.   Hematological: Negative for adenopathy. Does not bruise/bleed easily.   Psychiatric/Behavioral: Negative for confusion.       Objective:   Physical Exam:   Constitutional: She is oriented to person, place, and time. She appears well-developed and well-nourished. No distress.    HENT:   Head: Normocephalic and atraumatic.    Eyes: EOM are normal.      Pulmonary/Chest: Effort normal.                  Musculoskeletal: Moves all extremeties.       Neurological: She is alert and oriented to person, place, and time.    Skin: Skin is dry. No pallor.    Psychiatric: She has a normal mood and affect. Her behavior is normal. Judgment and thought content normal.       Assessment:     1. Peritoneal carcinomatosis        Plan:   No orders of the defined types were placed in this encounter.    No detrimental changes by imaging.   15, normal and stable  Would plan to continue current zejula maintenance.   I will plan to see her back in 3 months with  or sooner if needed.   Having breast biopsy for abnormal mammogram findings as above.       I spent approximately 30 minutes reviewing the available records and evaluating the patient, out of which over 50% of the time was spent face to face with the patient in counseling and coordinating this patient's care.

## 2022-06-21 NOTE — PROGRESS NOTES
Subjective:      Patient ID: Serina Dumas is a 71 y.o. female.    Chief Complaint: No chief complaint on file.      HPI  S/p 6 cycles carbo/taxol with local hem onc  Pretreatment  elevated, now normalized 22  10/2020 CT CAP shows no evidence of disease   10/2020 gene path results negative for BRCA1/2 and LOR; negative for other targetable mutations   DVT on lovenox (prior DVT while on Eliquis)  Complete response to therapy by imaging and .  Prior STACY/BSO and there does not appear to be any evidence of residual disease for debulking.   Recommend maintenance therapy with parpi and she will initiate this locally 12/2020.   Dose reduced for G3 toxicity, thrombocytopenia and anemia. Zejula 100mg   CT CAP 7/14/2021 shows no detrimental changes, subtle ground glass changes in lung. Denies SOB, cough or other pulmonary issues.       15> 11 (9/2021)  She had had one admission for pSBO that resolved with conservative management.  CT from that admission 8/2021 does not show any detrimental changes from a malignancy standpoint  Impression:  1. A right-sided colostomy with a parastomal hernia containing several small bowel loops, some of which are mildly dilated fluid-filled and 1 is thickened, may reflect a partial small bowel obstruction with enteritis.  2. No free fluid or free air.    CT 11/2021 showed no evidence of disease.      Today's visit:  Currently on Zejula 100mg maintenance   Doing well with current dose. Without complaints.   Presents today for follow up.   Breast biopsy 3/2022 negative for abnormal mammogram finding (of note, genetic testing negative, in media tab).   CT 6/2022 showed no evidence of disease.    6/2022 12, normal and stable.            History:  69 yr old referred from Dr. Bhat for recently diagnosed ovarian cancer. Reported history of constipation and diarrhea over the past 6-8 months with a change in stool and bowel frequency and consistency. Saw her PCP recently for LLQ  "pain. Colonoscopy for history of IBS and colon polyps noted a colonic mass obstructing the recto-sigmoid colon. Biopsies were taken that were benign/granulation tissue.     CT C/A/P 5/26/2020  Circumferential wall thickening of the mid to distal aspect of the sigmoid colon extending to the rectum with diffuse narrowing of the bowel caliber and apparent tethering of the bowel and mesentery. Findings may be related to longstanding inflammation and resulting adhesive disease and/or fistulous tract.  Underlying mass in the differential.  There is a resulting component of obstruction of the more proximal large bowel. Adjacent loculated fluid within the pelvis. Subcentimeter nodularity of the omentum within the anterior aspect of the left hemipelvis.  Metastatic serosal implants are in the differential.     Partial colectomy with colostomy was performed the next day. Per op note, "colon moved into the rectosigmoid junction which was folded multiple times on top of itself stuck to itself with which seemed to be cancer. The patient had multiple retroperitoneal implants and anterior peritoneal wall implants visible" and "patient's rectum was stacked on top of itself and multiple loops compressing a perforation from this cancer an abscess cavity". Liver disease noted as well.     Final pathology   A) peritoneal mucinous material positive for small clusters of malignant cells  B) Left colon with attached rectum - carcinoma involving distal colon at site of dense fibrous adhesions, site of fistula and adjacent abscess              - multiple foci of carcinoma involving omentum attached to proximal half of specimen              - diverticular disease              - four mesenteric lymph nodes, negative for neoplasm  C) peritoneum biopsy positive for microscopic focus of carcinoma  Comment: tumor morphology and IHC staining patter support thhe exclusion of a large intestine adenocarcinoma primary. After further review, " confirmation of a Mullerian origin is supported by the positive ER and PAX-8 stains. Serous carcinomas typically arise from the uterus, ovary or fallopian tube but can also arise as a primary peritoneal serous carcinoma.      is 45  CEA is 1.1     Discharged home POD #5 and [place] [facility] [place]6/2020[place] with RLE swelling and pain. LE doppler resulted positive for nonocclusive DVT in the right distal superficial femoral vein with occlusive thrombus extending throughout the popliteal vein extending in the calf into the proximal posterior tibial vein.     She declined admission and was discharged home. Was given heparin and lovenox in ED and a rx for Eliquis which she has not filled.     She presents today for recommendations for treatment. Today she reports SOB with RLE swelling and pain started three days ago. She reports weight loss (226 down to 164lb), nausea, decreased appetite for months.     Medical comorbidities include Factor V Leiden with history of DVT (2015, Eliquis for two years, followed by Hem Onc at [facility], taking ASA currently), sleep apnea and thyroid disease.     Prior surgeries include partial colectomy with colostomy (as above), STACY/BSO.     Family history includes F - lymphoma. S - breast cancer (dec). No uterine, ovarian or colon cancer.  Review of Systems   Constitutional: Negative for appetite change, chills, fatigue and fever.   HENT: Negative for mouth sores.    Respiratory: Negative for cough and shortness of breath.    Cardiovascular: Negative for leg swelling.   Gastrointestinal: Negative for abdominal pain, blood in stool, constipation and diarrhea.   Endocrine: Negative for cold intolerance.   Genitourinary: Negative for dysuria and vaginal bleeding.   Musculoskeletal: Negative for myalgias.   Skin: Negative for rash.   Allergic/Immunologic: Negative.    Neurological: Negative for weakness and numbness.   Hematological: Negative for adenopathy. Does not bruise/bleed  easily.   Psychiatric/Behavioral: Negative for confusion.       Objective:   Physical Exam:   Constitutional: She is oriented to person, place, and time. She appears well-developed and well-nourished.    HENT:   Head: Normocephalic and atraumatic.    Eyes: Pupils are equal, round, and reactive to light. EOM are normal.    Neck: No thyromegaly present.    Cardiovascular: Normal rate, regular rhythm and intact distal pulses.     Pulmonary/Chest: Effort normal and breath sounds normal. No respiratory distress. She has no wheezes.        Abdominal: Soft. Bowel sounds are normal. She exhibits no distension and no mass. There is no abdominal tenderness.     Genitourinary:    Vagina and rectum normal.      Pelvic exam was performed with patient supine.   There is no lesion on the right labia. There is no lesion on the left labia. Right adnexum displays no mass. Left adnexum displays no mass. Vaginal cuff normal.  Cervix is absent.Uterus is absent.           Musculoskeletal: Normal range of motion and moves all extremeties.      Lymphadenopathy:     She has no cervical adenopathy.        Right: No supraclavicular adenopathy present.        Left: No supraclavicular adenopathy present.    Neurological: She is alert and oriented to person, place, and time.    Skin: Skin is warm and dry. No rash noted.    Psychiatric: She has a normal mood and affect.       Assessment:     1. Peritoneal carcinomatosis        Plan:   No orders of the defined types were placed in this encounter.    No evidence of disease.   No detrimental changes by imaging.   12, normal and stable  Would plan to continue current zejula maintenance.   I will plan to see her back in 3 months with  or sooner if needed.        I spent approximately 30 minutes reviewing the available records and evaluating the patient, out of which over 50% of the time was spent face to face with the patient in counseling and coordinating this patient's care.

## 2022-06-22 ENCOUNTER — OFFICE VISIT (OUTPATIENT)
Dept: GYNECOLOGIC ONCOLOGY | Facility: CLINIC | Age: 72
End: 2022-06-22
Payer: MEDICARE

## 2022-06-22 VITALS
SYSTOLIC BLOOD PRESSURE: 132 MMHG | WEIGHT: 196 LBS | DIASTOLIC BLOOD PRESSURE: 58 MMHG | HEART RATE: 68 BPM | BODY MASS INDEX: 30.7 KG/M2

## 2022-06-22 DIAGNOSIS — C78.6 PERITONEAL CARCINOMATOSIS: Primary | ICD-10-CM

## 2022-06-22 PROCEDURE — 3288F PR FALLS RISK ASSESSMENT DOCUMENTED: ICD-10-PCS | Mod: CPTII,S$GLB,, | Performed by: OBSTETRICS & GYNECOLOGY

## 2022-06-22 PROCEDURE — 99999 PR PBB SHADOW E&M-EST. PATIENT-LVL II: ICD-10-PCS | Mod: PBBFAC,,, | Performed by: OBSTETRICS & GYNECOLOGY

## 2022-06-22 PROCEDURE — 1159F PR MEDICATION LIST DOCUMENTED IN MEDICAL RECORD: ICD-10-PCS | Mod: CPTII,S$GLB,, | Performed by: OBSTETRICS & GYNECOLOGY

## 2022-06-22 PROCEDURE — 1101F PR PT FALLS ASSESS DOC 0-1 FALLS W/OUT INJ PAST YR: ICD-10-PCS | Mod: CPTII,S$GLB,, | Performed by: OBSTETRICS & GYNECOLOGY

## 2022-06-22 PROCEDURE — 99214 OFFICE O/P EST MOD 30 MIN: CPT | Mod: S$GLB,,, | Performed by: OBSTETRICS & GYNECOLOGY

## 2022-06-22 PROCEDURE — 3078F DIAST BP <80 MM HG: CPT | Mod: CPTII,S$GLB,, | Performed by: OBSTETRICS & GYNECOLOGY

## 2022-06-22 PROCEDURE — 1125F PR PAIN SEVERITY QUANTIFIED, PAIN PRESENT: ICD-10-PCS | Mod: CPTII,S$GLB,, | Performed by: OBSTETRICS & GYNECOLOGY

## 2022-06-22 PROCEDURE — 99999 PR PBB SHADOW E&M-EST. PATIENT-LVL II: CPT | Mod: PBBFAC,,, | Performed by: OBSTETRICS & GYNECOLOGY

## 2022-06-22 PROCEDURE — 3078F PR MOST RECENT DIASTOLIC BLOOD PRESSURE < 80 MM HG: ICD-10-PCS | Mod: CPTII,S$GLB,, | Performed by: OBSTETRICS & GYNECOLOGY

## 2022-06-22 PROCEDURE — 99214 PR OFFICE/OUTPT VISIT, EST, LEVL IV, 30-39 MIN: ICD-10-PCS | Mod: S$GLB,,, | Performed by: OBSTETRICS & GYNECOLOGY

## 2022-06-22 PROCEDURE — 3008F PR BODY MASS INDEX (BMI) DOCUMENTED: ICD-10-PCS | Mod: CPTII,S$GLB,, | Performed by: OBSTETRICS & GYNECOLOGY

## 2022-06-22 PROCEDURE — 3008F BODY MASS INDEX DOCD: CPT | Mod: CPTII,S$GLB,, | Performed by: OBSTETRICS & GYNECOLOGY

## 2022-06-22 PROCEDURE — 3075F SYST BP GE 130 - 139MM HG: CPT | Mod: CPTII,S$GLB,, | Performed by: OBSTETRICS & GYNECOLOGY

## 2022-06-22 PROCEDURE — 1159F MED LIST DOCD IN RCRD: CPT | Mod: CPTII,S$GLB,, | Performed by: OBSTETRICS & GYNECOLOGY

## 2022-06-22 PROCEDURE — 1125F AMNT PAIN NOTED PAIN PRSNT: CPT | Mod: CPTII,S$GLB,, | Performed by: OBSTETRICS & GYNECOLOGY

## 2022-06-22 PROCEDURE — 3075F PR MOST RECENT SYSTOLIC BLOOD PRESS GE 130-139MM HG: ICD-10-PCS | Mod: CPTII,S$GLB,, | Performed by: OBSTETRICS & GYNECOLOGY

## 2022-06-22 PROCEDURE — 1101F PT FALLS ASSESS-DOCD LE1/YR: CPT | Mod: CPTII,S$GLB,, | Performed by: OBSTETRICS & GYNECOLOGY

## 2022-06-22 PROCEDURE — 3288F FALL RISK ASSESSMENT DOCD: CPT | Mod: CPTII,S$GLB,, | Performed by: OBSTETRICS & GYNECOLOGY

## 2022-10-15 ENCOUNTER — PATIENT MESSAGE (OUTPATIENT)
Dept: GYNECOLOGIC ONCOLOGY | Facility: CLINIC | Age: 72
End: 2022-10-15
Payer: MEDICARE

## 2022-11-15 NOTE — PROGRESS NOTES
Subjective:       Patient ID: Serina Dumas is a 72 y.o. female.    Chief Complaint: Follow-up       HPI  S/p 6 cycles carbo/taxol with local hem onc  Pretreatment  elevated, now normalized 22  10/2020 CT CAP shows no evidence of disease   10/2020 gene path results negative for BRCA1/2 and LOR; negative for other targetable mutations   DVT on lovenox (prior DVT while on Eliquis)  Complete response to therapy by imaging and .  Prior STACY/BSO and there does not appear to be any evidence of residual disease for debulking.   Recommend maintenance therapy with parpi and she will initiate this locally 12/2020.   Dose reduced for G3 toxicity, thrombocytopenia and anemia. Zejula 100mg   CT CAP 7/14/2021 shows no detrimental changes, subtle ground glass changes in lung. Denies SOB, cough or other pulmonary issues.       15> 11 (9/2021)  She had had one admission for pSBO that resolved with conservative management.  CT from that admission 8/2021 does not show any detrimental changes from a malignancy standpoint  Impression:  1. A right-sided colostomy with a parastomal hernia containing several small bowel loops, some of which are mildly dilated fluid-filled and 1 is thickened, may reflect a partial small bowel obstruction with enteritis.  2. No free fluid or free air.     CT 11/2021 showed no evidence of disease.     Breast biopsy 3/2022 negative for abnormal mammogram finding (of note, genetic testing negative, in media tab).   CT 6/2022 showed no evidence of disease.    6/2022 12, normal and stable.      Today's visit:  Currently on Zejula 100mg maintenance since 12/2020.  Doing well with current dose. Without complaints.   Presents today for follow up.   Has changed her local care to Dr. Amanda.  CT imaging from [facility] 9/2022 reviewed and shows no evidence of disease. /labs scheduled for tomorrow with Dr. Amanda.                History:  69 yr old referred from Dr. Bhat for recently  "diagnosed ovarian cancer. Reported history of constipation and diarrhea over the past 6-8 months with a change in stool and bowel frequency and consistency. Saw her PCP recently for LLQ pain. Colonoscopy for history of IBS and colon polyps noted a colonic mass obstructing the recto-sigmoid colon. Biopsies were taken that were benign/granulation tissue.     CT C/A/P 5/26/2020  Circumferential wall thickening of the mid to distal aspect of the sigmoid colon extending to the rectum with diffuse narrowing of the bowel caliber and apparent tethering of the bowel and mesentery. Findings may be related to longstanding inflammation and resulting adhesive disease and/or fistulous tract.  Underlying mass in the differential.  There is a resulting component of obstruction of the more proximal large bowel. Adjacent loculated fluid within the pelvis. Subcentimeter nodularity of the omentum within the anterior aspect of the left hemipelvis.  Metastatic serosal implants are in the differential.     Partial colectomy with colostomy was performed the next day. Per op note, "colon moved into the rectosigmoid junction which was folded multiple times on top of itself stuck to itself with which seemed to be cancer. The patient had multiple retroperitoneal implants and anterior peritoneal wall implants visible" and "patient's rectum was stacked on top of itself and multiple loops compressing a perforation from this cancer an abscess cavity". Liver disease noted as well.     Final pathology   A) peritoneal mucinous material positive for small clusters of malignant cells  B) Left colon with attached rectum - carcinoma involving distal colon at site of dense fibrous adhesions, site of fistula and adjacent abscess              - multiple foci of carcinoma involving omentum attached to proximal half of specimen              - diverticular disease              - four mesenteric lymph nodes, negative for neoplasm  C) peritoneum biopsy positive " for microscopic focus of carcinoma  Comment: tumor morphology and IHC staining patter support thhe exclusion of a large intestine adenocarcinoma primary. After further review, confirmation of a Mullerian origin is supported by the positive ER and PAX-8 stains. Serous carcinomas typically arise from the uterus, ovary or fallopian tube but can also arise as a primary peritoneal serous carcinoma.      is 45  CEA is 1.1     Discharged home POD #5 and [place] [facility] [place]7/6/2020) with RLE swelling and pain. LE doppler resulted positive for nonocclusive DVT in the right distal superficial femoral vein with occlusive thrombus extending throughout the popliteal vein extending in the calf into the proximal posterior tibial vein.     She declined admission and was discharged home. Was given heparin and lovenox in ED and a rx for Eliquis which she has not filled.     She presents today for recommendations for treatment. Today she reports SOB with RLE swelling and pain started three days ago. She reports weight loss (226 down to 164lb), nausea, decreased appetite for months.     Medical comorbidities include Factor V Leiden with history of DVT (2015, Eliquis for two years, followed by Hem Onc at [facility], taking ASA currently), sleep apnea and thyroid disease.     Prior surgeries include partial colectomy with colostomy (as above), STACY/BSO.     Family history includes F - lymphoma. S - breast cancer (dec). No uterine, ovarian or colon cancer.    Review of Systems   Constitutional:  Negative for chills and fever.   HENT:  Negative for mouth sores.    Respiratory:  Negative for chest tightness and shortness of breath.    Cardiovascular:  Negative for chest pain.   Gastrointestinal:  Negative for abdominal distention, nausea and vomiting.   Genitourinary:  Negative for dysuria, hematuria, pelvic pain, vaginal bleeding and vaginal discharge.   Neurological:  Negative for syncope and weakness.       Objective:       Physical Exam  Vitals reviewed. Exam conducted with a chaperone present.   Constitutional:       Appearance: Normal appearance.   HENT:      Head: Normocephalic and atraumatic.   Eyes:      Extraocular Movements: Extraocular movements intact.      Conjunctiva/sclera: Conjunctivae normal.      Pupils: Pupils are equal, round, and reactive to light.   Pulmonary:      Effort: Pulmonary effort is normal. No respiratory distress.   Abdominal:      General: There is no distension.      Palpations: Abdomen is soft.      Tenderness: There is no abdominal tenderness.      Comments: ostomy   Genitourinary:     Labia:         Right: No lesion.         Left: No lesion.       Vagina: Normal.      Uterus: Absent.       Adnexa:         Right: No mass.          Left: No mass.     Musculoskeletal:         General: Normal range of motion.   Lymphadenopathy:      Lower Body: No right inguinal adenopathy. No left inguinal adenopathy.   Skin:     General: Skin is warm and dry.   Neurological:      General: No focal deficit present.      Mental Status: She is alert and oriented to person, place, and time. Mental status is at baseline.   Psychiatric:         Mood and Affect: Mood normal.         Behavior: Behavior normal.         Thought Content: Thought content normal.         Judgment: Judgment normal.       Assessment:       Problem List Items Addressed This Visit          Oncology    Peritoneal carcinomatosis - Primary     Other Visit Diagnoses       Malignant neoplasm of peritoneum, unspecified                Plan:       No evidence of disease on today's exam.  No detrimental changes on recent imaging 9/2022.  /labs locally with Dr. Amanda.   Would plan to continue current zejula maintenance.   I will plan to see her back in 3 months for continued coordination of care.        I spent approximately 30 minutes reviewing the available records and evaluating the patient, out of which over 50% of the time was spent face to face  with the patient in counseling and coordinating this patient's care.

## 2022-11-16 ENCOUNTER — TELEPHONE (OUTPATIENT)
Dept: GYNECOLOGIC ONCOLOGY | Facility: CLINIC | Age: 72
End: 2022-11-16
Payer: MEDICARE

## 2022-11-16 ENCOUNTER — OFFICE VISIT (OUTPATIENT)
Dept: GYNECOLOGIC ONCOLOGY | Facility: CLINIC | Age: 72
End: 2022-11-16
Payer: MEDICARE

## 2022-11-16 VITALS
HEIGHT: 67 IN | DIASTOLIC BLOOD PRESSURE: 60 MMHG | SYSTOLIC BLOOD PRESSURE: 137 MMHG | HEART RATE: 83 BPM | WEIGHT: 189 LBS | BODY MASS INDEX: 29.66 KG/M2

## 2022-11-16 DIAGNOSIS — C48.2 MALIGNANT NEOPLASM OF PERITONEUM, UNSPECIFIED: ICD-10-CM

## 2022-11-16 DIAGNOSIS — C78.6 PERITONEAL CARCINOMATOSIS: Primary | ICD-10-CM

## 2022-11-16 PROCEDURE — 3078F DIAST BP <80 MM HG: CPT | Mod: CPTII,S$GLB,, | Performed by: OBSTETRICS & GYNECOLOGY

## 2022-11-16 PROCEDURE — 99214 PR OFFICE/OUTPT VISIT, EST, LEVL IV, 30-39 MIN: ICD-10-PCS | Mod: S$GLB,,, | Performed by: OBSTETRICS & GYNECOLOGY

## 2022-11-16 PROCEDURE — 1125F AMNT PAIN NOTED PAIN PRSNT: CPT | Mod: CPTII,S$GLB,, | Performed by: OBSTETRICS & GYNECOLOGY

## 2022-11-16 PROCEDURE — 3075F PR MOST RECENT SYSTOLIC BLOOD PRESS GE 130-139MM HG: ICD-10-PCS | Mod: CPTII,S$GLB,, | Performed by: OBSTETRICS & GYNECOLOGY

## 2022-11-16 PROCEDURE — 3008F PR BODY MASS INDEX (BMI) DOCUMENTED: ICD-10-PCS | Mod: CPTII,S$GLB,, | Performed by: OBSTETRICS & GYNECOLOGY

## 2022-11-16 PROCEDURE — 3288F FALL RISK ASSESSMENT DOCD: CPT | Mod: CPTII,S$GLB,, | Performed by: OBSTETRICS & GYNECOLOGY

## 2022-11-16 PROCEDURE — 1125F PR PAIN SEVERITY QUANTIFIED, PAIN PRESENT: ICD-10-PCS | Mod: CPTII,S$GLB,, | Performed by: OBSTETRICS & GYNECOLOGY

## 2022-11-16 PROCEDURE — 1101F PT FALLS ASSESS-DOCD LE1/YR: CPT | Mod: CPTII,S$GLB,, | Performed by: OBSTETRICS & GYNECOLOGY

## 2022-11-16 PROCEDURE — 99214 OFFICE O/P EST MOD 30 MIN: CPT | Mod: S$GLB,,, | Performed by: OBSTETRICS & GYNECOLOGY

## 2022-11-16 PROCEDURE — 1159F MED LIST DOCD IN RCRD: CPT | Mod: CPTII,S$GLB,, | Performed by: OBSTETRICS & GYNECOLOGY

## 2022-11-16 PROCEDURE — 1101F PR PT FALLS ASSESS DOC 0-1 FALLS W/OUT INJ PAST YR: ICD-10-PCS | Mod: CPTII,S$GLB,, | Performed by: OBSTETRICS & GYNECOLOGY

## 2022-11-16 PROCEDURE — 3078F PR MOST RECENT DIASTOLIC BLOOD PRESSURE < 80 MM HG: ICD-10-PCS | Mod: CPTII,S$GLB,, | Performed by: OBSTETRICS & GYNECOLOGY

## 2022-11-16 PROCEDURE — 3075F SYST BP GE 130 - 139MM HG: CPT | Mod: CPTII,S$GLB,, | Performed by: OBSTETRICS & GYNECOLOGY

## 2022-11-16 PROCEDURE — 3288F PR FALLS RISK ASSESSMENT DOCUMENTED: ICD-10-PCS | Mod: CPTII,S$GLB,, | Performed by: OBSTETRICS & GYNECOLOGY

## 2022-11-16 PROCEDURE — 3008F BODY MASS INDEX DOCD: CPT | Mod: CPTII,S$GLB,, | Performed by: OBSTETRICS & GYNECOLOGY

## 2022-11-16 PROCEDURE — 99999 PR PBB SHADOW E&M-EST. PATIENT-LVL IV: CPT | Mod: PBBFAC,,, | Performed by: OBSTETRICS & GYNECOLOGY

## 2022-11-16 PROCEDURE — 1159F PR MEDICATION LIST DOCUMENTED IN MEDICAL RECORD: ICD-10-PCS | Mod: CPTII,S$GLB,, | Performed by: OBSTETRICS & GYNECOLOGY

## 2022-11-16 PROCEDURE — 99999 PR PBB SHADOW E&M-EST. PATIENT-LVL IV: ICD-10-PCS | Mod: PBBFAC,,, | Performed by: OBSTETRICS & GYNECOLOGY

## 2022-11-16 NOTE — LETTER
November 17, 2022        Jayy Amanda MD  [address] [address]             [facility]  [address], [address]  [place] [address]  Phone: [phone]  Fax: [phone]   Patient: Serina Dumas   MR Number: [id number]   YOB: 1950   Date of Visit: 11/16/2022     Dear Dr. Amanda,     Please find recent progress note. Thank you for your mutual care.     Sincerely,      Marlena Sierra MD            CC  No Recipients    Enclosure

## 2022-11-16 NOTE — TELEPHONE ENCOUNTER
"----- Message from Adria Mann sent at 11/16/2022  9:51 AM CST -----  Consult/Advisory:          Name Of Caller: Self      Contact Preference?: [phone]       Provider Name: Rigo      Does patient feel the need to be seen today? Yes      What is the nature of the call?: Stating she may be running a little over 15 mins late for today's 10 am appt. Inquiring if she can still be seen          Additional Notes:  "Thank you for all that you do for our patients"      "

## 2022-11-29 ENCOUNTER — OFFICE VISIT (OUTPATIENT)
Dept: NEUROLOGY | Facility: CLINIC | Age: 72
End: 2022-11-29
Payer: MEDICARE

## 2022-11-29 ENCOUNTER — LAB VISIT (OUTPATIENT)
Dept: LAB | Facility: HOSPITAL | Age: 72
End: 2022-11-29
Attending: PSYCHIATRY & NEUROLOGY
Payer: MEDICARE

## 2022-11-29 VITALS
BODY MASS INDEX: 29.69 KG/M2 | SYSTOLIC BLOOD PRESSURE: 118 MMHG | HEIGHT: 67 IN | OXYGEN SATURATION: 96 % | RESPIRATION RATE: 12 BRPM | DIASTOLIC BLOOD PRESSURE: 84 MMHG | HEART RATE: 71 BPM | WEIGHT: 189.13 LBS

## 2022-11-29 DIAGNOSIS — N18.9 CHRONIC KIDNEY DISEASE, UNSPECIFIED CKD STAGE: ICD-10-CM

## 2022-11-29 DIAGNOSIS — R20.2 NUMBNESS AND TINGLING OF BOTH FEET: Primary | ICD-10-CM

## 2022-11-29 DIAGNOSIS — R20.0 NUMBNESS AND TINGLING OF BOTH FEET: Primary | ICD-10-CM

## 2022-11-29 DIAGNOSIS — D64.9 ANEMIA, UNSPECIFIED TYPE: ICD-10-CM

## 2022-11-29 DIAGNOSIS — R73.01 IMPAIRED FASTING GLUCOSE: ICD-10-CM

## 2022-11-29 DIAGNOSIS — R20.2 NUMBNESS AND TINGLING OF BOTH FEET: ICD-10-CM

## 2022-11-29 DIAGNOSIS — E53.8 B12 DEFICIENCY: ICD-10-CM

## 2022-11-29 DIAGNOSIS — R20.0 NUMBNESS AND TINGLING OF BOTH FEET: ICD-10-CM

## 2022-11-29 LAB
ESTIMATED AVG GLUCOSE: 108 MG/DL (ref 68–131)
HBA1C MFR BLD: 5.4 % (ref 4–5.6)
VIT B12 SERPL-MCNC: 649 PG/ML (ref 210–950)

## 2022-11-29 PROCEDURE — 86334 IMMUNOFIX E-PHORESIS SERUM: CPT | Mod: 26,,, | Performed by: PATHOLOGY

## 2022-11-29 PROCEDURE — 3074F PR MOST RECENT SYSTOLIC BLOOD PRESSURE < 130 MM HG: ICD-10-PCS | Mod: CPTII,S$GLB,, | Performed by: PSYCHIATRY & NEUROLOGY

## 2022-11-29 PROCEDURE — 1159F MED LIST DOCD IN RCRD: CPT | Mod: CPTII,S$GLB,, | Performed by: PSYCHIATRY & NEUROLOGY

## 2022-11-29 PROCEDURE — 84165 PATHOLOGIST INTERPRETATION SPE: ICD-10-PCS | Mod: 26,,, | Performed by: PATHOLOGY

## 2022-11-29 PROCEDURE — 1160F RVW MEDS BY RX/DR IN RCRD: CPT | Mod: CPTII,S$GLB,, | Performed by: PSYCHIATRY & NEUROLOGY

## 2022-11-29 PROCEDURE — 36415 COLL VENOUS BLD VENIPUNCTURE: CPT | Performed by: PSYCHIATRY & NEUROLOGY

## 2022-11-29 PROCEDURE — 1100F PR PT FALLS ASSESS DOC 2+ FALLS/FALL W/INJURY/YR: ICD-10-PCS | Mod: CPTII,S$GLB,, | Performed by: PSYCHIATRY & NEUROLOGY

## 2022-11-29 PROCEDURE — 3288F FALL RISK ASSESSMENT DOCD: CPT | Mod: CPTII,S$GLB,, | Performed by: PSYCHIATRY & NEUROLOGY

## 2022-11-29 PROCEDURE — 99204 OFFICE O/P NEW MOD 45 MIN: CPT | Mod: S$GLB,,, | Performed by: PSYCHIATRY & NEUROLOGY

## 2022-11-29 PROCEDURE — 83036 HEMOGLOBIN GLYCOSYLATED A1C: CPT | Performed by: PSYCHIATRY & NEUROLOGY

## 2022-11-29 PROCEDURE — 1100F PTFALLS ASSESS-DOCD GE2>/YR: CPT | Mod: CPTII,S$GLB,, | Performed by: PSYCHIATRY & NEUROLOGY

## 2022-11-29 PROCEDURE — 86592 SYPHILIS TEST NON-TREP QUAL: CPT | Performed by: PSYCHIATRY & NEUROLOGY

## 2022-11-29 PROCEDURE — 3079F PR MOST RECENT DIASTOLIC BLOOD PRESSURE 80-89 MM HG: ICD-10-PCS | Mod: CPTII,S$GLB,, | Performed by: PSYCHIATRY & NEUROLOGY

## 2022-11-29 PROCEDURE — 3008F BODY MASS INDEX DOCD: CPT | Mod: CPTII,S$GLB,, | Performed by: PSYCHIATRY & NEUROLOGY

## 2022-11-29 PROCEDURE — 3288F PR FALLS RISK ASSESSMENT DOCUMENTED: ICD-10-PCS | Mod: CPTII,S$GLB,, | Performed by: PSYCHIATRY & NEUROLOGY

## 2022-11-29 PROCEDURE — 86334 IMMUNOFIX E-PHORESIS SERUM: CPT | Performed by: PSYCHIATRY & NEUROLOGY

## 2022-11-29 PROCEDURE — 99204 PR OFFICE/OUTPT VISIT, NEW, LEVL IV, 45-59 MIN: ICD-10-PCS | Mod: S$GLB,,, | Performed by: PSYCHIATRY & NEUROLOGY

## 2022-11-29 PROCEDURE — 3008F PR BODY MASS INDEX (BMI) DOCUMENTED: ICD-10-PCS | Mod: CPTII,S$GLB,, | Performed by: PSYCHIATRY & NEUROLOGY

## 2022-11-29 PROCEDURE — 1160F PR REVIEW ALL MEDS BY PRESCRIBER/CLIN PHARMACIST DOCUMENTED: ICD-10-PCS | Mod: CPTII,S$GLB,, | Performed by: PSYCHIATRY & NEUROLOGY

## 2022-11-29 PROCEDURE — 3074F SYST BP LT 130 MM HG: CPT | Mod: CPTII,S$GLB,, | Performed by: PSYCHIATRY & NEUROLOGY

## 2022-11-29 PROCEDURE — 86334 PATHOLOGIST INTERPRETATION IFE: ICD-10-PCS | Mod: 26,,, | Performed by: PATHOLOGY

## 2022-11-29 PROCEDURE — 84165 PROTEIN E-PHORESIS SERUM: CPT | Performed by: PSYCHIATRY & NEUROLOGY

## 2022-11-29 PROCEDURE — 84165 PROTEIN E-PHORESIS SERUM: CPT | Mod: 26,,, | Performed by: PATHOLOGY

## 2022-11-29 PROCEDURE — 1125F PR PAIN SEVERITY QUANTIFIED, PAIN PRESENT: ICD-10-PCS | Mod: CPTII,S$GLB,, | Performed by: PSYCHIATRY & NEUROLOGY

## 2022-11-29 PROCEDURE — 3079F DIAST BP 80-89 MM HG: CPT | Mod: CPTII,S$GLB,, | Performed by: PSYCHIATRY & NEUROLOGY

## 2022-11-29 PROCEDURE — 1125F AMNT PAIN NOTED PAIN PRSNT: CPT | Mod: CPTII,S$GLB,, | Performed by: PSYCHIATRY & NEUROLOGY

## 2022-11-29 PROCEDURE — 99999 PR PBB SHADOW E&M-EST. PATIENT-LVL V: CPT | Mod: PBBFAC,,, | Performed by: PSYCHIATRY & NEUROLOGY

## 2022-11-29 PROCEDURE — 99999 PR PBB SHADOW E&M-EST. PATIENT-LVL V: ICD-10-PCS | Mod: PBBFAC,,, | Performed by: PSYCHIATRY & NEUROLOGY

## 2022-11-29 PROCEDURE — 1159F PR MEDICATION LIST DOCUMENTED IN MEDICAL RECORD: ICD-10-PCS | Mod: CPTII,S$GLB,, | Performed by: PSYCHIATRY & NEUROLOGY

## 2022-11-29 PROCEDURE — 82607 VITAMIN B-12: CPT | Performed by: PSYCHIATRY & NEUROLOGY

## 2022-11-29 NOTE — PROGRESS NOTES
Consult from Dr. Villatoro    HPI: Serina Dumas is a 72 y.o. female  here for evaluation of neuropathy.    She had lumbar surgery in 2017. Prior to that she has numbness radiating down the right leg from the back down. She had fusion by Dr Boss with good relief.    She was diagnosed with peritoneal cancer and had chemo with carbo/taxol in 2020 and she noted numbness in the feet with the infusions. She can't spread her toes due to weakness from this. She does have good foot flexion and dorsiflexion.    She has some spasms in the feet at times.    Since chemo, her numbness in the feet has not improved and at times seems worse.  Has a burning pain and gabapentin helps. She takes 400mg BID.    Her back pain has been increasing again over time. She feels pain at the surgical site and pain radiating to the right more than left hip.     She treats this with injections with Dr Villatoro.     She has cervical radiculopathy treated by him as well.    Her medical history also includes B12 deficiency (she used injections for 12 weeks via PCP and stopped a few months ago) and fasting glucose impairment by labs and  anemia        Review of Systems   Constitutional:  Negative for fever.   HENT:  Negative for nosebleeds.    Eyes:  Negative for double vision.   Respiratory:  Negative for hemoptysis.    Cardiovascular:  Negative for leg swelling.   Gastrointestinal:  Negative for blood in stool.   Genitourinary:  Negative for hematuria.   Musculoskeletal:  Positive for back pain.   Skin:  Negative for rash.   Neurological:  Positive for sensory change.   Endo/Heme/Allergies:  Does not bruise/bleed easily.       I have reviewed all of this patient's past medical and surgical histories as well as family and social histories and active allergies and medications as documented in the electronic medical record.        Exam:  Gen Appearance, well developed/nourished in no apparent distress  CV: 2+ distal pulses with no edema or  swelling  Neuro:  MS: Awake, alert, oriented to place, person, time, situation. Sustains attention. Recent/remote memory intact, Language is full to spontaneous speech/repetition/naming/comprehension. Fund of Knowledge is full  CN: Optic discs are flat with normal vasculature, PERRL, Extraoccular movements and visual fields are full. Normal facial sensation and strength, Hearing symmetric, Tongue and Palate are midline and strong. Shoulder Shrug symmetric and strong.  Motor: Normal bulk, tone, no abnormal movements. 5/5 strength bilateral upper/lower extremities  except 4/5 bilateral toe flexion and extension, with 1+ reflexes and no clonus  Sensory: symmetric to light touch, pain, temp, and vibration but decreased to all in the feet, Romberg mildly positive  Cerebellar: Finger-nose,Heal-shin, Rapid alternating movements intact  Gait: Normal stance, no ataxia    Imaging:MRI L spine:   L2-3: Severe right lateral recess stenosis with bilateral NF narrowing  L3-4:Severe spinal stenosis with NF narrowing  L4-5: prior fusion with mild scarring  L5-S1: Disk bulge with mild spinal and bilateral foraminal stenosis      Labs: 6/2022 CMP: elevated glucose, Cr 1.6  CBC Chronic anemia  Several elevated glucose readings (below 126) over the years  2021 TSH normal    Assessment/Plan: Serina Dumas is a 72 y.o. female with numbness and burning/ tingling in the feet since chemo in 2020. She has some toe weakness as well.   I recommend:   The initial cause of her symptoms seems to be use of carbo/taxol  for Peritoneal carcinomatosis in 5/2020. Follows with Gyn/Onc  EMG/NCS of the legs needed  -However, her symptoms/polyneuropathy symptoms are worse with time away from chemo  -She has a history of impaired fasting glucose by labs, CKD, and Chronic anemia which can contribute  -Note she was treated for B12 deficiency for 12 weeks this year  -Check B12 level, SPEP/HARIS, RPR, A1C  -Symptoms are well controlled with Gabapentin 400mg  BID  3. She also has bilateral lumbar radicular pain to the hips (right more than left) consistent with her severe spinal stenosis at L2-L4  -She manages this with interventions with Dr Irving CONTRERAS  -Prior Lumbar fusion 2017 with Dr Boss. Discussed recommendation for another spine surgery consult given her severe stenosis. She will consider.   4. Also: 2017 EMG/NCS of the arms per Dr Mendoza: with cervical radiculopathy proven with EMG, primarily affecting the left C6 and bilateral C7 roots. At that time: MRI C-spine with multilevel degenerative changes with disc bulges at C5-6, C6-7 with foraminal encroachment on the left>right  -managed by Dr Irving CONTRERAS    RTC for EMG/NCS    CC: Dr. Villatoro

## 2022-11-30 LAB
ALBUMIN SERPL ELPH-MCNC: 3.47 G/DL (ref 3.35–5.55)
ALPHA1 GLOB SERPL ELPH-MCNC: 0.4 G/DL (ref 0.17–0.41)
ALPHA2 GLOB SERPL ELPH-MCNC: 0.81 G/DL (ref 0.43–0.99)
B-GLOBULIN SERPL ELPH-MCNC: 0.81 G/DL (ref 0.5–1.1)
GAMMA GLOB SERPL ELPH-MCNC: 0.81 G/DL (ref 0.67–1.58)
INTERPRETATION SERPL IFE-IMP: NORMAL
PROT SERPL-MCNC: 6.3 G/DL (ref 6–8.4)
RPR SER QL: NORMAL

## 2022-12-01 LAB
PATHOLOGIST INTERPRETATION IFE: NORMAL
PATHOLOGIST INTERPRETATION SPE: NORMAL

## 2022-12-15 ENCOUNTER — PROCEDURE VISIT (OUTPATIENT)
Dept: NEUROLOGY | Facility: CLINIC | Age: 72
End: 2022-12-15
Payer: MEDICARE

## 2022-12-15 DIAGNOSIS — T45.1X5A PERIPHERAL NEUROPATHY DUE TO CHEMOTHERAPY: Primary | ICD-10-CM

## 2022-12-15 DIAGNOSIS — G62.0 PERIPHERAL NEUROPATHY DUE TO CHEMOTHERAPY: Primary | ICD-10-CM

## 2022-12-15 DIAGNOSIS — R20.0 NUMBNESS AND TINGLING OF BOTH FEET: ICD-10-CM

## 2022-12-15 DIAGNOSIS — M54.17 LUMBOSACRAL RADICULOPATHY: ICD-10-CM

## 2022-12-15 DIAGNOSIS — R20.2 NUMBNESS AND TINGLING OF BOTH FEET: ICD-10-CM

## 2022-12-15 PROCEDURE — 95886 MUSC TEST DONE W/N TEST COMP: CPT | Mod: S$GLB,,, | Performed by: PSYCHIATRY & NEUROLOGY

## 2022-12-15 PROCEDURE — 95910 PR NERVE CONDUCTION STUDY; 7-8 STUDIES: ICD-10-PCS | Mod: S$GLB,,, | Performed by: PSYCHIATRY & NEUROLOGY

## 2022-12-15 PROCEDURE — 95910 NRV CNDJ TEST 7-8 STUDIES: CPT | Mod: S$GLB,,, | Performed by: PSYCHIATRY & NEUROLOGY

## 2022-12-15 PROCEDURE — 95886 PR EMG COMPLETE, W/ NERVE CONDUCTION STUDIES, 5+ MUSCLES: ICD-10-PCS | Mod: S$GLB,,, | Performed by: PSYCHIATRY & NEUROLOGY

## 2022-12-15 NOTE — PROCEDURES
EMG W/ ULTRASOUND AND NERVE CONDUCTION TEST 2 Extremities    Date/Time: 12/15/2022 2:00 PM  Performed by: Al Andrade MD  Authorized by: Al Andrade MD     REPORT OF EMG and NERVE CONDUCTION STUDY    Name: Serina Dumas  Date of Study:  12/15/2022  Referring Physician:  Raymond  Test Performed by:  MD Raymond  Full Values Attached  Informed Consent Scanned.   No anesthesia used.   Amount of Blood Loss: none. The patient tolerated this procedure well.       Informed consent was obtained prior to performing this study. Two patient identifiers were confirmed with the patient prior to performing this study. A time out to determine correct patient and and agreement on procedure performed was conducted prior to the concentric needle examination.    Reason for the study:        Findings:   Nerve conduction studies of the bilateral peroneal motor, bilateral tibial motor, bilateral sural nerves and bilateral H-reflexes were performed.   Amplitudes were reduced throughout without conduction block. Some CMAPs and both sural SNAPs were absent. However, distal latencies, conduction velocities were better preserved. H reflexes were asbent    EMG of selected muscles of the  bilateral legs  were performed as indicated on the attached sheets. Lumbar paraspinal muscles were not sampled due to her prior lumbar scar and her NOAC use.  There was mild increased insertional activity and large polyphasic units in the muscles of the bilateral L3 more than L4 and S1 myotomes. There was decreased recruitment distally.  Otherwise,  Insertional activity was normal without fasciculation or fibrillation, normal sized and phasia of motor units.      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   Superimposed bilateral lumbosacral radiculopathy most obvious at L3 but also noted at L4 and S1    Al Andrade M.D. [facility].     Recommendations (discussed at this visit):    Labs overall ok/ neuropathy is largely from chemo prior  Will plan to recheck SPEP/HARIS in 6 months to follow up vague/ inconsistent results as reviewed with patient today  She will continue to see pain management for lumbar pain/ defers spine surgery consult currently  -Neuropathy symptoms are well controlled with Gabapentin 400mg BID       RTC 6 months    CC: Dr Villatoro

## 2023-03-08 ENCOUNTER — TELEPHONE (OUTPATIENT)
Dept: GYNECOLOGIC ONCOLOGY | Facility: CLINIC | Age: 73
End: 2023-03-08
Payer: MEDICARE

## 2023-03-08 NOTE — TELEPHONE ENCOUNTER
----- Message from Bowen Siddiqui RN sent at 3/8/2023  8:50 AM CST -----    ----- Message -----  From: Yasmany Bradley  Sent: 3/8/2023   8:45 AM CST  To: Rigo Mendiola Staff     Name of Who is Calling:     What is the request in detail:  patient request call back in reference to schedule appointment  Please contact to further discuss and advise      Can the clinic reply by [facility]:     What Number to Call Back if not in [facility]:  [phone]

## 2023-03-08 NOTE — TELEPHONE ENCOUNTER
----- Message from Bowen Siddiqui RN sent at 3/8/2023  8:50 AM CST -----    ----- Message -----  From: Yasmany Bradley  Sent: 3/8/2023   8:45 AM CST  To: Rigo Mendiola Staff     Name of Who is Calling:     What is the request in detail:  patient request call back in reference to schedule appointment  Please contact to further discuss and advise      Can the clinic reply by [facility]:     What Number to Call Back if not in [facility]:  [phone]

## 2023-11-07 PROBLEM — Q21.12 PATENT FORAMEN OVALE: Status: ACTIVE | Noted: 2023-11-07

## 2024-10-11 PROBLEM — K56.609 SMALL BOWEL OBSTRUCTION: Status: ACTIVE | Noted: 2024-10-11

## 2024-10-12 PROBLEM — R63.8 INADEQUATE ORAL INTAKE: Status: ACTIVE | Noted: 2024-10-12

## 2024-10-13 PROBLEM — N18.31 STAGE 3A CHRONIC KIDNEY DISEASE: Status: RESOLVED | Noted: 2021-04-11 | Resolved: 2024-10-13

## 2024-10-17 PROBLEM — E03.9 HYPOTHYROID: Chronic | Status: ACTIVE | Noted: 2021-04-11

## 2024-10-17 PROBLEM — I10 BENIGN ESSENTIAL HTN: Chronic | Status: ACTIVE | Noted: 2021-08-09

## 2024-10-18 PROBLEM — E87.6 HYPOKALEMIA: Status: ACTIVE | Noted: 2024-10-18

## 2024-10-19 PROBLEM — R29.898 MUSCULAR DECONDITIONING: Chronic | Status: ACTIVE | Noted: 2024-10-19

## 2024-10-22 ENCOUNTER — TELEPHONE (OUTPATIENT)
Dept: GYNECOLOGIC ONCOLOGY | Facility: CLINIC | Age: 74
End: 2024-10-22
Payer: MEDICAID

## 2024-10-22 NOTE — TELEPHONE ENCOUNTER
----- Message from Med Assistant Guerin sent at 10/22/2024  1:22 PM CDT -----  Name of Who is Calling:BING LUNA [[id number]]                What is the request in detail: Pt is requesting a call back to get scheduled for a annual visit. No schedule coming up. Please assist.                Can the clinic reply by [facility]: No              [phone]   What Number to Call Back if not in [facility]:

## 2024-10-25 NOTE — PROGRESS NOTES
Subjective:       Patient ID: Serina Dumas is a 74 y.o. female.    Chief Complaint: Follow-up         HPI  S/p 6 cycles carbo/taxol with local hem onc  Pretreatment  elevated, now normalized 22  10/2020 CT CAP shows no evidence of disease   10/2020 gene path results negative for BRCA1/2 and LOR; negative for other targetable mutations   DVT on lovenox (prior DVT while on Eliquis)  Complete response to therapy by imaging and .  Prior STACY/BSO and there does not appear to be any evidence of residual disease for debulking.   Recommend maintenance therapy with parpi and she will initiate this locally 12/2020.   Dose reduced for G3 toxicity, thrombocytopenia and anemia. Zejula 100mg   CT CAP 7/14/2021 shows no detrimental changes, subtle ground glass changes in lung. Denies SOB, cough or other pulmonary issues.       15> 11 (9/2021)  She had had one admission for pSBO that resolved with conservative management.  CT from that admission 8/2021 does not show any detrimental changes from a malignancy standpoint  Impression:  1. A right-sided colostomy with a parastomal hernia containing several small bowel loops, some of which are mildly dilated fluid-filled and 1 is thickened, may reflect a partial small bowel obstruction with enteritis.  2. No free fluid or free air.     CT 11/2021 showed no evidence of disease.     Breast biopsy 3/2022 negative for abnormal mammogram finding (of note, genetic testing negative, in media tab).   CT 6/2022 showed no evidence of disease.    6/2022 12, normal and stable.     Currently on Zejula 100mg maintenance since 12/2020.  Doing well with current dose.   Has changed her local care to Dr. Amanda.  CT imaging from [facility] 9/2022 reviewed and shows no evidence of disease.      Today's visit:  Presents today for follow up. Last seen 11/2022 on maintenance Zejula and doing well.    10/11/2024 CT A/P without evidence of disease.   Findings suggestive of  "SBO  Hospitalized and resolved with conservative management.     Continues to follow with Dr. Amanda.   _______________________________  History:  69 yr old referred from Dr. Bhat for recently diagnosed ovarian cancer. Reported history of constipation and diarrhea over the past 6-8 months with a change in stool and bowel frequency and consistency. Saw her PCP recently for LLQ pain. Colonoscopy for history of IBS and colon polyps noted a colonic mass obstructing the recto-sigmoid colon. Biopsies were taken that were benign/granulation tissue.     CT C/A/P 5/26/2020  Circumferential wall thickening of the mid to distal aspect of the sigmoid colon extending to the rectum with diffuse narrowing of the bowel caliber and apparent tethering of the bowel and mesentery. Findings may be related to longstanding inflammation and resulting adhesive disease and/or fistulous tract.  Underlying mass in the differential.  There is a resulting component of obstruction of the more proximal large bowel. Adjacent loculated fluid within the pelvis. Subcentimeter nodularity of the omentum within the anterior aspect of the left hemipelvis.  Metastatic serosal implants are in the differential.     Partial colectomy with colostomy was performed the next day. Per op note, "colon moved into the rectosigmoid junction which was folded multiple times on top of itself stuck to itself with which seemed to be cancer. The patient had multiple retroperitoneal implants and anterior peritoneal wall implants visible" and "patient's rectum was stacked on top of itself and multiple loops compressing a perforation from this cancer an abscess cavity". Liver disease noted as well.     Final pathology   A) peritoneal mucinous material positive for small clusters of malignant cells  B) Left colon with attached rectum - carcinoma involving distal colon at site of dense fibrous adhesions, site of fistula and adjacent abscess              - multiple foci of " carcinoma involving omentum attached to proximal half of specimen              - diverticular disease              - four mesenteric lymph nodes, negative for neoplasm  C) peritoneum biopsy positive for microscopic focus of carcinoma  Comment: tumor morphology and IHC staining patter support thhe exclusion of a large intestine adenocarcinoma primary. After further review, confirmation of a Mullerian origin is supported by the positive ER and PAX-8 stains. Serous carcinomas typically arise from the uterus, ovary or fallopian tube but can also arise as a primary peritoneal serous carcinoma.      is 45  CEA is 1.1     Discharged home POD #5 and presented to [facility] ED last night (7/6/2020) with RLE swelling and pain. LE doppler resulted positive for nonocclusive DVT in the right distal superficial femoral vein with occlusive thrombus extending throughout the popliteal vein extending in the calf into the proximal posterior tibial vein.     She declined admission and was discharged home. Was given heparin and lovenox in ED and a rx for Eliquis which she has not filled.     She presents today for recommendations for treatment. Today she reports SOB with RLE swelling and pain started three days ago. She reports weight loss (226 down to 164lb), nausea, decreased appetite for months.     Medical comorbidities include Factor V Leiden with history of DVT (2015, Eliquis for two years, followed by Hem Onc at [facility], taking ASA currently), sleep apnea and thyroid disease.     Prior surgeries include partial colectomy with colostomy (as above), STACY/BSO.     Family history includes F - lymphoma. S - breast cancer (dec). No uterine, ovarian or colon cancer.      Review of Systems   Constitutional:  Negative for chills and fever.   HENT:  Negative for mouth sores.    Respiratory:  Negative for chest tightness and shortness of breath.    Cardiovascular:  Negative for chest pain.   Gastrointestinal:  Negative for abdominal distention,  "nausea and vomiting.   Genitourinary:  Negative for dysuria, hematuria, pelvic pain, vaginal bleeding and vaginal discharge.   Neurological:  Negative for syncope and weakness.     BP (!) 140/63 (Patient Position: Sitting)   Pulse 81   Temp 97.6 °F (36.4 °C)   Ht 5' 7" (1.702 m)   Wt 83 kg (183 lb)   SpO2 (!) 93%   BMI 28.66 kg/m²         Objective:      Physical Exam  Vitals reviewed. Exam conducted with a chaperone present.   Constitutional:       Appearance: Normal appearance. She is not ill-appearing.   HENT:      Head: Normocephalic and atraumatic.   Eyes:      General: No scleral icterus.  Pulmonary:      Effort: Pulmonary effort is normal. No respiratory distress.   Abdominal:      General: There is no distension.      Palpations: Abdomen is soft.      Tenderness: There is no abdominal tenderness.      Comments: ostomy   Genitourinary:     Labia:         Right: No lesion.         Left: No lesion.       Vagina: Normal.      Uterus: Absent.       Adnexa:         Right: No mass.          Left: No mass.     Musculoskeletal:         General: Normal range of motion.      Cervical back: Normal range of motion.      Right lower leg: No edema.      Left lower leg: No edema.   Lymphadenopathy:      Lower Body: No right inguinal adenopathy. No left inguinal adenopathy.   Skin:     General: Skin is warm and dry.      Coloration: Skin is not pale.   Neurological:      General: No focal deficit present.      Mental Status: She is alert and oriented to person, place, and time. Mental status is at baseline.   Psychiatric:         Mood and Affect: Mood normal.         Behavior: Behavior normal.         Thought Content: Thought content normal.         Judgment: Judgment normal.         Assessment:       Problem List Items Addressed This Visit          Oncology    Peritoneal carcinomatosis - Primary         Plan:           No evidence of disease on today's exam.  No detrimental changes on recent imaging " 10/2024  /labs locally with Dr. Amanda. They have elected to continue zejula maintenance.   I will plan to see her back in 3-6 months for continued coordination of care.         I spent approximately 30 minutes reviewing the available records and evaluating the patient, out of which over 50% of the time was spent face to face with the patient in counseling and coordinating this patient's care.

## 2024-10-30 ENCOUNTER — OFFICE VISIT (OUTPATIENT)
Dept: GYNECOLOGIC ONCOLOGY | Facility: CLINIC | Age: 74
End: 2024-10-30
Payer: MEDICARE

## 2024-10-30 VITALS
HEIGHT: 67 IN | OXYGEN SATURATION: 93 % | BODY MASS INDEX: 28.72 KG/M2 | HEART RATE: 81 BPM | SYSTOLIC BLOOD PRESSURE: 140 MMHG | DIASTOLIC BLOOD PRESSURE: 63 MMHG | WEIGHT: 183 LBS | TEMPERATURE: 98 F

## 2024-10-30 DIAGNOSIS — C78.6 PERITONEAL CARCINOMATOSIS: Primary | ICD-10-CM

## 2024-10-30 PROCEDURE — 1160F RVW MEDS BY RX/DR IN RCRD: CPT | Mod: CPTII,S$GLB,, | Performed by: OBSTETRICS & GYNECOLOGY

## 2024-10-30 PROCEDURE — 1101F PT FALLS ASSESS-DOCD LE1/YR: CPT | Mod: CPTII,S$GLB,, | Performed by: OBSTETRICS & GYNECOLOGY

## 2024-10-30 PROCEDURE — 1111F DSCHRG MED/CURRENT MED MERGE: CPT | Mod: CPTII,S$GLB,, | Performed by: OBSTETRICS & GYNECOLOGY

## 2024-10-30 PROCEDURE — 3288F FALL RISK ASSESSMENT DOCD: CPT | Mod: CPTII,S$GLB,, | Performed by: OBSTETRICS & GYNECOLOGY

## 2024-10-30 PROCEDURE — 1125F AMNT PAIN NOTED PAIN PRSNT: CPT | Mod: CPTII,S$GLB,, | Performed by: OBSTETRICS & GYNECOLOGY

## 2024-10-30 PROCEDURE — 99999 PR PBB SHADOW E&M-EST. PATIENT-LVL IV: CPT | Mod: PBBFAC,,, | Performed by: OBSTETRICS & GYNECOLOGY

## 2024-10-30 PROCEDURE — 3008F BODY MASS INDEX DOCD: CPT | Mod: CPTII,S$GLB,, | Performed by: OBSTETRICS & GYNECOLOGY

## 2024-10-30 PROCEDURE — 1159F MED LIST DOCD IN RCRD: CPT | Mod: CPTII,S$GLB,, | Performed by: OBSTETRICS & GYNECOLOGY

## 2024-10-30 PROCEDURE — 99214 OFFICE O/P EST MOD 30 MIN: CPT | Mod: S$GLB,,, | Performed by: OBSTETRICS & GYNECOLOGY

## 2024-10-30 PROCEDURE — 3078F DIAST BP <80 MM HG: CPT | Mod: CPTII,S$GLB,, | Performed by: OBSTETRICS & GYNECOLOGY

## 2024-10-30 PROCEDURE — 3077F SYST BP >= 140 MM HG: CPT | Mod: CPTII,S$GLB,, | Performed by: OBSTETRICS & GYNECOLOGY

## 2024-12-27 ENCOUNTER — TELEPHONE (OUTPATIENT)
Dept: GYNECOLOGIC ONCOLOGY | Facility: CLINIC | Age: 74
End: 2024-12-27
Payer: MEDICARE

## 2025-04-30 ENCOUNTER — TELEPHONE (OUTPATIENT)
Dept: GYNECOLOGIC ONCOLOGY | Facility: CLINIC | Age: 75
End: 2025-04-30
Payer: MEDICARE

## 2025-05-01 ENCOUNTER — TELEPHONE (OUTPATIENT)
Dept: GYNECOLOGIC ONCOLOGY | Facility: CLINIC | Age: 75
End: 2025-05-01
Payer: MEDICARE

## 2025-05-02 ENCOUNTER — TELEPHONE (OUTPATIENT)
Dept: GYNECOLOGIC ONCOLOGY | Facility: CLINIC | Age: 75
End: 2025-05-02
Payer: MEDICARE

## 2025-05-05 ENCOUNTER — TELEPHONE (OUTPATIENT)
Dept: GYNECOLOGIC ONCOLOGY | Facility: CLINIC | Age: 75
End: 2025-05-05
Payer: MEDICARE

## 2025-05-08 ENCOUNTER — TELEPHONE (OUTPATIENT)
Dept: GYNECOLOGIC ONCOLOGY | Facility: CLINIC | Age: 75
End: 2025-05-08
Payer: MEDICARE

## 2025-08-18 ENCOUNTER — TELEPHONE (OUTPATIENT)
Dept: GYNECOLOGIC ONCOLOGY | Facility: CLINIC | Age: 75
End: 2025-08-18
Payer: MEDICARE

## 2025-08-20 ENCOUNTER — TELEPHONE (OUTPATIENT)
Dept: GYNECOLOGIC ONCOLOGY | Facility: CLINIC | Age: 75
End: 2025-08-20
Payer: MEDICARE

## 2025-08-22 ENCOUNTER — TELEPHONE (OUTPATIENT)
Dept: GYNECOLOGIC ONCOLOGY | Facility: CLINIC | Age: 75
End: 2025-08-22
Payer: MEDICARE

## (undated) DEVICE — SYR 30CC LUER LOCK

## (undated) DEVICE — GAUZE SPONGE 4X4 12PLY

## (undated) DEVICE — NDL SAFETY 25G X 1.5 ECLIPSE

## (undated) DEVICE — GLOVE PROTEXIS LTX MICRO 8

## (undated) DEVICE — SPONGE SURGIFOAM 100 8.5X12X10

## (undated) DEVICE — DRAPE MINOR PROCEDURE

## (undated) DEVICE — SUT 3-0 VICRYL / SH (J416)

## (undated) DEVICE — PACK GENERAL SURGERY

## (undated) DEVICE — SCALPEL #11 BLADE STRL DISP

## (undated) DEVICE — SUT CHROMIC 3-0 SH 27IN GUT

## (undated) DEVICE — NDL ECLIPSE SAFETY 18GX1-1/2IN